# Patient Record
Sex: MALE | Race: BLACK OR AFRICAN AMERICAN | NOT HISPANIC OR LATINO | Employment: FULL TIME | URBAN - METROPOLITAN AREA
[De-identification: names, ages, dates, MRNs, and addresses within clinical notes are randomized per-mention and may not be internally consistent; named-entity substitution may affect disease eponyms.]

---

## 2017-09-13 ENCOUNTER — OFFICE VISIT (OUTPATIENT)
Dept: INTERNAL MEDICINE | Facility: CLINIC | Age: 37
End: 2017-09-13
Payer: COMMERCIAL

## 2017-09-13 VITALS
SYSTOLIC BLOOD PRESSURE: 128 MMHG | HEART RATE: 52 BPM | TEMPERATURE: 97 F | DIASTOLIC BLOOD PRESSURE: 88 MMHG | WEIGHT: 213.19 LBS | BODY MASS INDEX: 30.52 KG/M2 | HEIGHT: 70 IN

## 2017-09-13 DIAGNOSIS — F41.1 GENERALIZED ANXIETY DISORDER: Primary | ICD-10-CM

## 2017-09-13 PROCEDURE — 99204 OFFICE O/P NEW MOD 45 MIN: CPT | Mod: S$GLB,,, | Performed by: INTERNAL MEDICINE

## 2017-09-13 PROCEDURE — 99999 PR PBB SHADOW E&M-NEW PATIENT-LVL III: CPT | Mod: PBBFAC,,, | Performed by: INTERNAL MEDICINE

## 2017-09-13 PROCEDURE — 3008F BODY MASS INDEX DOCD: CPT | Mod: S$GLB,,, | Performed by: INTERNAL MEDICINE

## 2017-09-13 RX ORDER — ESCITALOPRAM OXALATE 10 MG/1
10 TABLET ORAL DAILY
Qty: 30 TABLET | Refills: 1 | Status: SHIPPED | OUTPATIENT
Start: 2017-09-13 | End: 2017-10-11 | Stop reason: SDUPTHER

## 2017-09-13 NOTE — PATIENT INSTRUCTIONS
Understanding Generalized Anxiety Disorder (HOANG)  Anxiety can fill you with worry and fear. Sometimes anxiety is healthy. It alerts you to a potential threat and gets you to respond and take action. But for some people, anxiety gets so bad it causes problems in daily life. If you find yourself in a constant state of anxiety, you may have an anxiety disorder called generalized anxiety disorder (HOANG). Speak with your healthcare provider or mental health professional to learn more. He or she can help.     What is generalized anxiety disorder?  With HOANG, you might worry about money, your family and friends, work, or the world in general. You might not even be sure what you're anxious about. But whatever it is, you have an intense fear that the worst will happen. These feelings never really go away. In people age 65 and older, HOANG is one of the most commonly diagnosed anxiety disorders.  Many times it occurs with depression. This constant worry affects your quality of life and makes it hard to function. HOANG can cause physical symptoms, too.  What are common symptoms of generalized anxiety disorder?  People with HOANG often think they have a physical illness. The disorder can cause symptoms, such as:  · Muscle tension, especially in the neck and shoulders  · Nausea and stomach problems  · Frequent headaches  · Feeling lightheaded  · Restlessness, trouble sleeping  · Feeling irritable and on edge all the time  How can generalized anxiety disorder be treated?  HOANG can be treated with medicine or therapy (also called counseling), or both. Medicine helps to reduce symptoms, so you can continue with your daily routine. Therapy helps you understand the cause of your anxiety and learn how to manage it. Both forms of treatment help you deal with problems that anxiety causes in your life. This helps you to be healthier and happier.  Date Last Reviewed: 5/1/2017  © 8282-2235 [employer] [place]. [address] [facility],  [place] [address]. All rights reserved. This information is not intended as a substitute for professional medical care. Always follow your healthcare professional's instructions.        Escitalopram tablets  What is this medicine?  ESCITALOPRAM (es sye TANIA oh pram) is used to treat depression and certain types of anxiety.  How should I use this medicine?  Take this medicine by mouth with a glass of water. Follow the directions on the prescription label. You can take it with or without food. If it upsets your stomach, take it with food. Take your medicine at regular intervals. Do not take it more often than directed. Do not stop taking this medicine suddenly except upon the advice of your doctor. Stopping this medicine too quickly may cause serious side effects or your condition may worsen.  A special MedGuide will be given to you by the pharmacist with each prescription and refill. Be sure to read this information carefully each time.  Talk to your pediatrician regarding the use of this medicine in children. Special care may be needed.  What side effects may I notice from receiving this medicine?  Side effects that you should report to your doctor or health care professional as soon as possible:  · allergic reactions like skin rash, itching or hives, swelling of the face, lips, or tongue  · confusion  · feeling faint or lightheaded, falls  · fast talking and excited feelings or actions that are out of control  · hallucination, loss of contact with reality  · seizures  · suicidal thoughts or other mood changes  · unusual bleeding or bruising  Side effects that usually do not require medical attention (report to your doctor or health care professional if they continue or are bothersome):  · blurred vision  · changes in appetite  · change in sex drive or performance  · headache  · increased sweating  · nausea  What may interact with this medicine?  Do not take this medicine with any of the following  medications:  · certain medicines for fungal infections like fluconazole, itraconazole, ketoconazole, posaconazole, voriconazole  · cisapride  · citalopram  · dofetilide  · dronedarone  · linezolid  · MAOIs like Carbex, Eldepryl, Marplan, Nardil, and Parnate  · methylene blue (injected into a vein)  · pimozide  · thioridazine  · ziprasidone  This medicine may also interact with the following medications:  · alcohol  · aspirin and aspirin-like medicines  · carbamazepine  · certain medicines for depression, anxiety, or psychotic disturbances  · certain medicines for migraine headache like almotriptan, eletriptan, frovatriptan, naratriptan, rizatriptan, sumatriptan, zolmitriptan  · certain medicines for sleep  · certain medicines that treat or prevent blood clots like warfarin, enoxaparin, dalteparin  · cimetidine  · diuretics  · fentanyl  · furazolidone  · isoniazid  · lithium  · metoprolol  · NSAIDs, medicines for pain and inflammation, like ibuprofen or naproxen  · other medicines that prolong the QT interval (cause an abnormal heart rhythm)  · procarbazine  · rasagiline  · supplements like [place]'s wort, kava kava, valerian  · tramadol  · tryptophan  What if I miss a dose?  If you miss a dose, take it as soon as you can. If it is almost time for your next dose, take only that dose. Do not take double or extra doses.  Where should I keep my medicine?  Keep out of reach of children.  Store at room temperature between 15 and 30 degrees C (59 and 86 degrees F). Throw away any unused medicine after the expiration date.  What should I tell my health care provider before I take this medicine?  They need to know if you have any of these conditions:  · bipolar disorder or a family history of bipolar disorder  · diabetes  · glaucoma  · heart disease  · kidney or liver disease  · receiving electroconvulsive therapy  · seizures (convulsions)  · suicidal thoughts, plans, or attempt by you or a family member  · an unusual or  allergic reaction to escitalopram, the related drug citalopram, other medicines, foods, dyes, or preservatives  · pregnant or trying to become pregnant  · breast-feeding  What should I watch for while using this medicine?  Tell your doctor if your symptoms do not get better or if they get worse. Visit your doctor or health care professional for regular checks on your progress. Because it may take several weeks to see the full effects of this medicine, it is important to continue your treatment as prescribed by your doctor.  Patients and their families should watch out for new or worsening thoughts of suicide or depression. Also watch out for sudden changes in feelings such as feeling anxious, agitated, panicky, irritable, hostile, aggressive, impulsive, severely restless, overly excited and hyperactive, or not being able to sleep. If this happens, especially at the beginning of treatment or after a change in dose, call your health care professional.  You may get drowsy or dizzy. Do not drive, use machinery, or do anything that needs mental alertness until you know how this medicine affects you. Do not stand or sit up quickly, especially if you are an older patient. This reduces the risk of dizzy or fainting spells. Alcohol may interfere with the effect of this medicine. Avoid alcoholic drinks.  Your mouth may get dry. Chewing sugarless gum or sucking hard candy, and drinking plenty of water may help. Contact your doctor if the problem does not go away or is severe.  NOTE:This sheet is a summary. It may not cover all possible information. If you have questions about this medicine, talk to your doctor, pharmacist, or health care provider. Copyright© 2017 [employer]

## 2017-09-13 NOTE — PROGRESS NOTES
Subjective:       Patient ID: Ez Muniz Sr. is a 37 y.o. male.    Chief Complaint: Establish Care    37 y.o. Unknown male     Patient presents with:  Establish Care    HPI: Here today to establish care. He was previously a patient of Dr. Godwin.   He is having a lot of anxiety. He states he has had symptoms for a while but it seems to be getting worse. He has symptoms daily and several times during the day. He has seen a counselor.   He denies depression.   He denies substance use.     Past Medical History:  Anxiety  Hypertension  Insomnia    Past Surgical History:  WISDOM TOOTH EXTRACTION    Review of patient's family history indicates:    No Known Problems              Mother                    No Known Problems              Father                    No current outpatient prescriptions on file prior to visit.  No current facility-administered medications on file prior to visit.     Allergies:   Review of patient's allergies indicates:  No Known Allergies              Review of Systems   Constitutional: Negative for fatigue, fever and unexpected weight change.   HENT: Negative for hearing loss, trouble swallowing and voice change.    Eyes: Negative for visual disturbance.   Respiratory: Negative for cough and shortness of breath.    Cardiovascular: Negative for chest pain, palpitations and leg swelling.   Gastrointestinal: Negative for abdominal pain, blood in stool, constipation and diarrhea.   Genitourinary: Negative for difficulty urinating and dysuria.   Musculoskeletal: Negative for arthralgias, back pain, gait problem and joint swelling.   Neurological: Negative for dizziness and headaches.   Psychiatric/Behavioral: Positive for sleep disturbance. Negative for dysphoric mood. The patient is nervous/anxious.        Objective:      Physical Exam   Constitutional: He is oriented to person, place, and time. He appears well-developed and well-nourished. No distress.   Eyes: No scleral icterus.   Neck:  No tracheal deviation present. No thyromegaly present.   Cardiovascular: Normal rate, regular rhythm and normal heart sounds.    Pulmonary/Chest: Effort normal and breath sounds normal. No respiratory distress.   Abdominal: Soft. Bowel sounds are normal.   Lymphadenopathy:     He has no cervical adenopathy.   Neurological: He is alert and oriented to person, place, and time.   Skin: Skin is warm and dry.   Psychiatric: He has a normal mood and affect.   Vitals reviewed.      Assessment:       1. Generalized anxiety disorder        Plan:       Ez was seen today for establish care.    Diagnoses and all orders for this visit:    Generalized anxiety disorder  -     escitalopram oxalate (LEXAPRO) 10 MG tablet; Take 1 tablet (10 mg total) by mouth once daily. Discussed medication risks and benefits.   -     Recommend continued counseling     F/U in 4 weeks

## 2017-10-11 ENCOUNTER — OFFICE VISIT (OUTPATIENT)
Dept: INTERNAL MEDICINE | Facility: CLINIC | Age: 37
End: 2017-10-11
Payer: COMMERCIAL

## 2017-10-11 VITALS
BODY MASS INDEX: 30.42 KG/M2 | TEMPERATURE: 97 F | HEART RATE: 65 BPM | OXYGEN SATURATION: 95 % | SYSTOLIC BLOOD PRESSURE: 120 MMHG | WEIGHT: 212.5 LBS | HEIGHT: 70 IN | DIASTOLIC BLOOD PRESSURE: 80 MMHG

## 2017-10-11 DIAGNOSIS — R05.9 COUGH: ICD-10-CM

## 2017-10-11 DIAGNOSIS — F41.1 GENERALIZED ANXIETY DISORDER: Primary | ICD-10-CM

## 2017-10-11 PROCEDURE — 99213 OFFICE O/P EST LOW 20 MIN: CPT | Mod: S$GLB,,, | Performed by: INTERNAL MEDICINE

## 2017-10-11 PROCEDURE — 99999 PR PBB SHADOW E&M-EST. PATIENT-LVL III: CPT | Mod: PBBFAC,,, | Performed by: INTERNAL MEDICINE

## 2017-10-11 RX ORDER — ESCITALOPRAM OXALATE 10 MG/1
10 TABLET ORAL DAILY
Qty: 90 TABLET | Refills: 1 | Status: SHIPPED | OUTPATIENT
Start: 2017-10-11 | End: 2018-07-20 | Stop reason: SDUPTHER

## 2017-10-11 RX ORDER — PROMETHAZINE HYDROCHLORIDE AND DEXTROMETHORPHAN HYDROBROMIDE 6.25; 15 MG/5ML; MG/5ML
5 SYRUP ORAL EVERY 8 HOURS PRN
Qty: 118 ML | Refills: 0 | Status: SHIPPED | OUTPATIENT
Start: 2017-10-11 | End: 2017-10-21

## 2017-10-11 NOTE — PATIENT INSTRUCTIONS
Viral Upper Respiratory Illness (Adult)  You have a viral upper respiratory illness (URI), which is another term for the common cold. This illness is contagious during the first few days. It is spread through the air by coughing and sneezing. It may also be spread by direct contact (touching the sick person and then touching your own eyes, nose, or mouth). Frequent handwashing will decrease risk of spread. Most viral illnesses go away within 7 to 10 days with rest and simple home remedies. Sometimes the illness may last for several weeks. Antibiotics will not kill a virus, and they are generally not prescribed for this condition.    Home care  · If symptoms are severe, rest at home for the first 2 to 3 days. When you resume activity, don't let yourself get too tired.  · Avoid being exposed to cigarette smoke (yours or others).  · You may use acetaminophen or ibuprofen to control pain and fever, unless another medicine was prescribed. (Note: If you have chronic liver or kidney disease, have ever had a stomach ulcer or gastrointestinal bleeding, or are taking blood-thinning medicines, talk with your healthcare provider before using these medicines.) Aspirin should never be given to anyone under 18 years of age who is ill with a viral infection or fever. It may cause severe liver or brain damage.  · Your appetite may be poor, so a light diet is fine. Avoid dehydration by drinking 6 to 8 glasses of fluids per day (water, soft drinks, juices, tea, or soup). Extra fluids will help loosen secretions in the nose and lungs.  · Over-the-counter cold medicines will not shorten the length of time youre sick, but they may be helpful for the following symptoms: cough, sore throat, and nasal and sinus congestion. (Note: Do not use decongestants if you have high blood pressure.)  Follow-up care  Follow up with your healthcare provider, or as advised.  When to seek medical advice  Call your healthcare provider right away if any  of these occur:  · Cough with lots of colored sputum (mucus)  · Severe headache; face, neck, or ear pain  · Difficulty swallowing due to throat pain  · Fever of 100.4°F (38°C)  Call 911, or get immediate medical care  Call emergency services right away if any of these occur:  · Chest pain, shortness of breath, wheezing, or difficulty breathing  · Coughing up blood  · Inability to swallow due to throat pain  Date Last Reviewed: 9/13/2015  © 3917-0080 [employer]. [address] [place] [address]. All rights reserved. This information is not intended as a substitute for professional medical care. Always follow your healthcare professional's instructions.

## 2017-10-11 NOTE — PROGRESS NOTES
Ez Garnica Muniz Sr.  37 y.o. Unknown male     Chief Complaint   Patient presents with    Follow-up        HPI: Presents to the clinic for a 4 week f/u on anxiety. He was started on Lexapro and feels it has helped. He reports improved anxiety. He denies side effects.   He is complaining of a cough. He recently went to urgent care and was prescribed an antibiotic. He has completed the antibiotic. He states the cough is improving but OTC medication is not helping. He denies having a fever    PMH: Reviewed    MEDS: Reviewed med card    ALLERGIES: Reviewed allergy card    PE: Reviewed vitals  GENERAL: Alert and oriented, no acute distress  HEART: Regular rate and rhythm  LUNGS: Unlabored respirations, bilaterally clear to auscultation     ASSESSMENT/PLAN:    Ez was seen today for follow-up.    Diagnoses and all orders for this visit:    Generalized anxiety disorder  -     Continue escitalopram oxalate (LEXAPRO) 10 MG tablet; Take 1 tablet (10 mg total) by mouth once daily.    Cough  -     No need for continued antibiotics since improving and no red flags  -     promethazine-dextromethorphan (PROMETHAZINE-DM) 6.25-15 mg/5 mL Syrp; Take 5 mLs by mouth every 8 (eight) hours as needed.  -     Educational information provided    RTC: 3 months for anxiety

## 2018-01-10 ENCOUNTER — PATIENT OUTREACH (OUTPATIENT)
Dept: ADMINISTRATIVE | Facility: HOSPITAL | Age: 38
End: 2018-01-10

## 2018-01-24 ENCOUNTER — OFFICE VISIT (OUTPATIENT)
Dept: INTERNAL MEDICINE | Facility: CLINIC | Age: 38
End: 2018-01-24
Payer: COMMERCIAL

## 2018-01-24 VITALS
DIASTOLIC BLOOD PRESSURE: 84 MMHG | HEART RATE: 59 BPM | SYSTOLIC BLOOD PRESSURE: 118 MMHG | TEMPERATURE: 97 F | HEIGHT: 70 IN | WEIGHT: 222.44 LBS | OXYGEN SATURATION: 96 % | BODY MASS INDEX: 31.85 KG/M2

## 2018-01-24 DIAGNOSIS — E66.9 OBESITY (BMI 30.0-34.9): ICD-10-CM

## 2018-01-24 DIAGNOSIS — Z13.220 SCREENING FOR HYPERLIPIDEMIA: ICD-10-CM

## 2018-01-24 DIAGNOSIS — F41.1 GENERALIZED ANXIETY DISORDER: Primary | ICD-10-CM

## 2018-01-24 PROBLEM — E66.811 OBESITY (BMI 30.0-34.9): Status: ACTIVE | Noted: 2018-01-24

## 2018-01-24 PROCEDURE — 99999 PR PBB SHADOW E&M-EST. PATIENT-LVL III: CPT | Mod: PBBFAC,,, | Performed by: INTERNAL MEDICINE

## 2018-01-24 PROCEDURE — 99213 OFFICE O/P EST LOW 20 MIN: CPT | Mod: S$GLB,,, | Performed by: INTERNAL MEDICINE

## 2018-01-24 NOTE — PROGRESS NOTES
Ez Garnica Flavio Sr.  37 y.o. Unknown male    Chief Complaint   Patient presents with    Follow-up     HPI: Here today to follow up on anxiety. He states the Lexapro is working well. He takes it daily as prescribed. He denies side effects. He has no other complaints.     Past Medical History:   Diagnosis Date    Anxiety     Hypertension     Insomnia        Current Outpatient Prescriptions on File Prior to Visit   Medication Sig Dispense Refill    escitalopram oxalate (LEXAPRO) 10 MG tablet Take 1 tablet (10 mg total) by mouth once daily. 90 tablet 1     No current facility-administered medications on file prior to visit.        Allergies:  Review of patient's allergies indicates:  No Known Allergies    ROS:  Denies headache, dizziness, chest pain or shortness of breath    PHYSICAL EXAM:  VITAL SIGNS: Reviewed  GENERAL: Alert and oriented, no acute distress  HEART: Regular rate   LUNGS: Respirations unlabored  PSYCH: Mood and affect appropriate     ASSESSMENT/PLAN:  Ez was seen today for follow-up.    Diagnoses and all orders for this visit:    Generalized anxiety disorder  -     Continue Lexapro    Obesity (BMI 30.0-34.9)  -     Lifestyle modifications discussed    Screening for hyperlipidemia  -     Lipid panel; Future    F/U in 6 months and as needed.

## 2018-04-17 ENCOUNTER — OFFICE VISIT (OUTPATIENT)
Dept: INTERNAL MEDICINE | Facility: CLINIC | Age: 38
End: 2018-04-17
Payer: COMMERCIAL

## 2018-04-17 VITALS
WEIGHT: 219.38 LBS | SYSTOLIC BLOOD PRESSURE: 110 MMHG | TEMPERATURE: 98 F | DIASTOLIC BLOOD PRESSURE: 86 MMHG | HEIGHT: 70 IN | BODY MASS INDEX: 31.41 KG/M2 | HEART RATE: 58 BPM | OXYGEN SATURATION: 97 %

## 2018-04-17 DIAGNOSIS — R10.9 ABDOMINAL DISCOMFORT: Primary | ICD-10-CM

## 2018-04-17 PROCEDURE — 99213 OFFICE O/P EST LOW 20 MIN: CPT | Mod: S$GLB,,, | Performed by: INTERNAL MEDICINE

## 2018-04-17 PROCEDURE — 99999 PR PBB SHADOW E&M-EST. PATIENT-LVL III: CPT | Mod: PBBFAC,,, | Performed by: INTERNAL MEDICINE

## 2018-04-17 NOTE — PATIENT INSTRUCTIONS
Abdominal Pain    Abdominal pain is pain in the stomach or belly area. Everyone has this pain from time to time. In many cases it goes away on its own. But abdominal pain can sometimes be due to a serious problem, such as appendicitis. So its important to know when to seek help.  Causes of abdominal pain  There are many possible causes of abdominal pain. Common causes in adults include:  · Constipation, diarrhea, or gas  · Stomach acid flowing back up into the esophagus (acid reflux or heartburn)  · Severe acid reflux, called GERD (gastroesophageal reflux disease)  · A sore in the lining of the stomach or small intestine (peptic ulcer)  · Inflammation of the gallbladder, liver, or pancreas  · Gallstones or kidney stones  · Appendicitis   · Intestinal blockage   · An internal organ pushing through a muscle or other tissue (hernia)  · Urinary tract infections  · In women, menstrual cramps, fibroids, or endometriosis  · Inflammation or infection of the intestines  Diagnosing the cause of abdominal pain  Your healthcare provider will do a physical exam help find the cause of your pain. If needed, tests will be ordered. Belly pain has many possible causes. So it can be hard to find the reason for your pain. Giving details about your pain can help. Tell your provider where and when you feel the pain, and what makes it better or worse. Also let your provider know if you have other symptoms such as:  · Fever  · Tiredness  · Upset stomach (nausea)  · Vomiting  · Changes in bathroom habits  Treating abdominal pain  Some causes of pain need emergency medical treatment right away. These include appendicitis or a bowel blockage. Other problems can be treated with rest, fluids, or medicines. Your healthcare provider can give you specific instructions for treatment or self-care based on what is causing your pain.  If you have vomiting or diarrhea, sip water or other clear fluids. When you are ready to eat solid foods again,  start with small amounts of easy-to-digest, low-fat foods. These include apple sauce, toast, or crackers.   When to seek medical care  Call 911 or go to the hospital right away if you:  · Cant pass stool and are vomiting  · Are vomiting blood or have bloody diarrhea or black, tarry diarrhea  · Have chest, neck, or shoulder pain  · Feel like you might pass out  · Have pain in your shoulder blades with nausea  · Have sudden, severe belly pain  · Have new, severe pain unlike any you have felt before  · Have a belly that is rigid, hard, and tender to touch  Call your healthcare provider if you have:  · Pain for more than 5 days  · Bloating for more than 2 days  · Diarrhea for more than 5 days  · A fever of 100.4°F (38.0°C) or higher, or as directed by your provider  · Pain that gets worse  · Weight loss for no reason  · Continued lack of appetite  · Blood in your stool  How to prevent abdominal pain  Here are some tips to help prevent abdominal pain:  · Eat smaller amounts of food at one time.  · Avoid greasy, fried, or other high-fat foods.  · Avoid foods that give you gas.  · Exercise regularly.  · Drink plenty of fluids.  To help prevent GERD symptoms:  · Quit smoking.  · Reduce alcohol and certain foods that increase stomach acid.  · Avoid aspirin and over-the-counter pain and fever medicines (NSAIDS or nonsteroidal anti-inflammatory drugs), if possible  · Lose extra weight.  · Finish eating at least 2 hours before you go to bed or lie down.  · Raise the head of your bed.  Date Last Reviewed: 7/1/2016  © 5472-6573 [employer]. [address] [place] [address], [place] [address]. All rights reserved. This information is not intended as a substitute for professional medical care. Always follow your healthcare professional's instructions.

## 2018-04-17 NOTE — PROGRESS NOTES
Subjective:       Patient ID: Ez Muniz Sr. is a 37 y.o. male.    Chief Complaint: Abdominal Pain    Abdominal Pain   This is a new problem. The current episode started more than 1 month ago. The onset quality is undetermined. The problem occurs intermittently. The problem has been waxing and waning. The pain is located in the generalized abdominal region. The pain is mild. The quality of the pain is burning and cramping. The abdominal pain does not radiate. Associated symptoms include belching. Pertinent negatives include no anorexia, constipation, diarrhea, dysuria, fever, hematochezia, melena, nausea, vomiting or weight loss. Nothing aggravates the pain. The pain is relieved by nothing. He has tried proton pump inhibitors for the symptoms. The treatment provided mild relief. There is no history of abdominal surgery.     Review of Systems   Constitutional: Negative for appetite change, fever, unexpected weight change and weight loss.   Gastrointestinal: Positive for abdominal pain. Negative for anorexia, blood in stool, constipation, diarrhea, hematochezia, melena, nausea and vomiting.   Genitourinary: Negative for dysuria.       Objective:      Physical Exam   Constitutional: He is oriented to person, place, and time. He appears well-developed and well-nourished. No distress.   Eyes: No scleral icterus.   Cardiovascular: Normal rate.    Pulmonary/Chest: Effort normal.   Abdominal: Soft. He exhibits no distension. There is no tenderness. There is no rebound and no guarding.   Neurological: He is alert and oriented to person, place, and time.   Skin: Skin is warm and dry.   Psychiatric: He has a normal mood and affect.   Vitals reviewed.      Assessment:       1. Abdominal discomfort        Plan:       Ez was seen today for abdominal pain.    Diagnoses and all orders for this visit:    Abdominal discomfort  -     Recommend Activia vs probiotics  -     Monitor    RTC as needed

## 2018-07-17 ENCOUNTER — PATIENT OUTREACH (OUTPATIENT)
Dept: ADMINISTRATIVE | Facility: HOSPITAL | Age: 38
End: 2018-07-17

## 2018-07-20 DIAGNOSIS — F41.1 GENERALIZED ANXIETY DISORDER: ICD-10-CM

## 2018-07-23 RX ORDER — ESCITALOPRAM OXALATE 10 MG/1
TABLET ORAL
Qty: 90 TABLET | Refills: 0 | Status: SHIPPED | OUTPATIENT
Start: 2018-07-23 | End: 2019-04-10 | Stop reason: SDUPTHER

## 2019-04-04 DIAGNOSIS — F41.1 GENERALIZED ANXIETY DISORDER: ICD-10-CM

## 2019-04-04 RX ORDER — ESCITALOPRAM OXALATE 10 MG/1
TABLET ORAL
Qty: 90 TABLET | Refills: 3 | OUTPATIENT
Start: 2019-04-04

## 2019-04-10 DIAGNOSIS — F41.1 GENERALIZED ANXIETY DISORDER: ICD-10-CM

## 2019-04-10 RX ORDER — ESCITALOPRAM OXALATE 10 MG/1
TABLET ORAL
Qty: 90 TABLET | Refills: 0 | Status: SHIPPED | OUTPATIENT
Start: 2019-04-10 | End: 2019-04-29 | Stop reason: SDUPTHER

## 2019-04-29 ENCOUNTER — OFFICE VISIT (OUTPATIENT)
Dept: INTERNAL MEDICINE | Facility: CLINIC | Age: 39
End: 2019-04-29
Payer: COMMERCIAL

## 2019-04-29 VITALS
OXYGEN SATURATION: 97 % | DIASTOLIC BLOOD PRESSURE: 88 MMHG | BODY MASS INDEX: 30.65 KG/M2 | HEIGHT: 70 IN | WEIGHT: 214.06 LBS | TEMPERATURE: 98 F | SYSTOLIC BLOOD PRESSURE: 130 MMHG | HEART RATE: 65 BPM

## 2019-04-29 DIAGNOSIS — E66.9 OBESITY (BMI 30.0-34.9): ICD-10-CM

## 2019-04-29 DIAGNOSIS — F41.1 GENERALIZED ANXIETY DISORDER: ICD-10-CM

## 2019-04-29 DIAGNOSIS — Z00.00 ANNUAL PHYSICAL EXAM: Primary | ICD-10-CM

## 2019-04-29 PROCEDURE — 99395 PR PREVENTIVE VISIT,EST,18-39: ICD-10-PCS | Mod: S$GLB,,, | Performed by: INTERNAL MEDICINE

## 2019-04-29 PROCEDURE — 99999 PR PBB SHADOW E&M-EST. PATIENT-LVL III: CPT | Mod: PBBFAC,,, | Performed by: INTERNAL MEDICINE

## 2019-04-29 PROCEDURE — 99395 PREV VISIT EST AGE 18-39: CPT | Mod: S$GLB,,, | Performed by: INTERNAL MEDICINE

## 2019-04-29 PROCEDURE — 99999 PR PBB SHADOW E&M-EST. PATIENT-LVL III: ICD-10-PCS | Mod: PBBFAC,,, | Performed by: INTERNAL MEDICINE

## 2019-04-29 RX ORDER — ESCITALOPRAM OXALATE 10 MG/1
TABLET ORAL
Qty: 90 TABLET | Refills: 3 | Status: SHIPPED | OUTPATIENT
Start: 2019-04-29 | End: 2020-05-19

## 2019-04-29 NOTE — PROGRESS NOTES
Subjective:       Patient ID: Ez Muniz Sr. is a 38 y.o. male.    Chief Complaint: Annual Exam    Ez Muniz Sr.  38 y.o. Unknown male     Patient presents with:  Annual Exam    HPI: Here today for an annual physical. He is without significant complaints.   Anxiety--stable on Lexapro.   He needs refills.   He is . He has two sons.   He is active.     Lipid Panel due on 1982  Influenza Vaccine due on 09/13/2019  TETANUS VACCINE due on 10/11/2027    Past Medical History:  Anxiety  Hypertension  Insomnia    Past Surgical History:  WISDOM TOOTH EXTRACTION    Review of patient's family history indicates:  Problem: No Known Problems      Relation: Mother          Age of Onset: (Not Specified)  Problem: No Known Problems      Relation: Father          Age of Onset: (Not Specified)      Current Outpatient Medications on File Prior to Visit:  escitalopram oxalate (LEXAPRO) 10 MG tablet, TAKE 1 TABLET BY MOUTH ONE TIME DAILY, Disp: 90 tablet, Rfl: 0    Allergies:  Review of patient's allergies indicates:  No Known Allergies              Review of Systems   Constitutional: Negative for activity change, appetite change, fever and unexpected weight change.   HENT: Negative for hearing loss and trouble swallowing.    Eyes: Negative for visual disturbance.   Respiratory: Negative for cough and shortness of breath.    Cardiovascular: Negative for chest pain and leg swelling.   Gastrointestinal: Negative for abdominal pain, constipation and diarrhea.   Genitourinary: Negative for difficulty urinating.   Musculoskeletal: Negative for arthralgias, back pain and gait problem.   Neurological: Negative for dizziness and headaches.   Psychiatric/Behavioral: Negative for sleep disturbance. The patient is nervous/anxious.        Objective:      Physical Exam   Constitutional: He is oriented to person, place, and time. He appears well-developed and well-nourished. No distress.   Eyes: No scleral icterus.    Neck: No JVD present. No thyromegaly present.   Cardiovascular: Normal rate, regular rhythm and normal heart sounds.   Pulmonary/Chest: Effort normal and breath sounds normal. No respiratory distress. He has no wheezes. He has no rales.   Abdominal: Soft. Bowel sounds are normal.   Musculoskeletal: He exhibits no edema.   Lymphadenopathy:     He has no cervical adenopathy.   Neurological: He is alert and oriented to person, place, and time.   Skin: Skin is warm and dry.   Psychiatric: He has a normal mood and affect.   Vitals reviewed.      Assessment:       1. Annual physical exam    2. Generalized anxiety disorder    3. Obesity (BMI 30.0-34.9)        Plan:       Ez was seen today for annual exam.    Diagnoses and all orders for this visit:    Annual physical exam  -     Counseled regarding age appropriate screenings and immunizations  -     Counseled regarding lifestyle modifications  -     CBC auto differential; Future  -     Comprehensive metabolic panel; Future  -     TSH; Future  -     Lipid panel; Future    Generalized anxiety disorder  -     Refill escitalopram oxalate (LEXAPRO) 10 MG tablet; TAKE 1 TABLET BY MOUTH ONE TIME DAILY    Obesity (BMI 30.0-34.9)  -     Lifestyle modifications discussed    RTC annually and as needed.

## 2019-05-21 ENCOUNTER — LAB VISIT (OUTPATIENT)
Dept: LAB | Facility: HOSPITAL | Age: 39
End: 2019-05-21
Attending: INTERNAL MEDICINE
Payer: COMMERCIAL

## 2019-05-21 DIAGNOSIS — Z00.00 ANNUAL PHYSICAL EXAM: ICD-10-CM

## 2019-05-21 LAB
ALBUMIN SERPL BCP-MCNC: 4 G/DL (ref 3.5–5.2)
ALP SERPL-CCNC: 63 U/L (ref 55–135)
ALT SERPL W/O P-5'-P-CCNC: 20 U/L (ref 10–44)
ANION GAP SERPL CALC-SCNC: 8 MMOL/L (ref 8–16)
AST SERPL-CCNC: 23 U/L (ref 10–40)
BASOPHILS # BLD AUTO: 0.04 K/UL (ref 0–0.2)
BASOPHILS NFR BLD: 1.2 % (ref 0–1.9)
BILIRUB SERPL-MCNC: 0.5 MG/DL (ref 0.1–1)
BUN SERPL-MCNC: 14 MG/DL (ref 6–20)
CALCIUM SERPL-MCNC: 9.3 MG/DL (ref 8.7–10.5)
CHLORIDE SERPL-SCNC: 105 MMOL/L (ref 95–110)
CHOLEST SERPL-MCNC: 197 MG/DL (ref 120–199)
CHOLEST/HDLC SERPL: 3.8 {RATIO} (ref 2–5)
CO2 SERPL-SCNC: 27 MMOL/L (ref 23–29)
CREAT SERPL-MCNC: 1.1 MG/DL (ref 0.5–1.4)
DIFFERENTIAL METHOD: ABNORMAL
EOSINOPHIL # BLD AUTO: 0 K/UL (ref 0–0.5)
EOSINOPHIL NFR BLD: 0.6 % (ref 0–8)
ERYTHROCYTE [DISTWIDTH] IN BLOOD BY AUTOMATED COUNT: 12.7 % (ref 11.5–14.5)
EST. GFR  (AFRICAN AMERICAN): >60 ML/MIN/1.73 M^2
EST. GFR  (NON AFRICAN AMERICAN): >60 ML/MIN/1.73 M^2
GLUCOSE SERPL-MCNC: 83 MG/DL (ref 70–110)
HCT VFR BLD AUTO: 44.7 % (ref 40–54)
HDLC SERPL-MCNC: 52 MG/DL (ref 40–75)
HDLC SERPL: 26.4 % (ref 20–50)
HGB BLD-MCNC: 14.6 G/DL (ref 14–18)
IMM GRANULOCYTES # BLD AUTO: 0.01 K/UL (ref 0–0.04)
IMM GRANULOCYTES NFR BLD AUTO: 0.3 % (ref 0–0.5)
LDLC SERPL CALC-MCNC: 133.4 MG/DL (ref 63–159)
LYMPHOCYTES # BLD AUTO: 1.6 K/UL (ref 1–4.8)
LYMPHOCYTES NFR BLD: 47 % (ref 18–48)
MCH RBC QN AUTO: 29.3 PG (ref 27–31)
MCHC RBC AUTO-ENTMCNC: 32.7 G/DL (ref 32–36)
MCV RBC AUTO: 90 FL (ref 82–98)
MONOCYTES # BLD AUTO: 0.2 K/UL (ref 0.3–1)
MONOCYTES NFR BLD: 6.3 % (ref 4–15)
NEUTROPHILS # BLD AUTO: 1.5 K/UL (ref 1.8–7.7)
NEUTROPHILS NFR BLD: 44.6 % (ref 38–73)
NONHDLC SERPL-MCNC: 145 MG/DL
NRBC BLD-RTO: 0 /100 WBC
PLATELET # BLD AUTO: 293 K/UL (ref 150–350)
PMV BLD AUTO: 9.5 FL (ref 9.2–12.9)
POTASSIUM SERPL-SCNC: 4.6 MMOL/L (ref 3.5–5.1)
PROT SERPL-MCNC: 7.5 G/DL (ref 6–8.4)
RBC # BLD AUTO: 4.98 M/UL (ref 4.6–6.2)
SODIUM SERPL-SCNC: 140 MMOL/L (ref 136–145)
TRIGL SERPL-MCNC: 58 MG/DL (ref 30–150)
TSH SERPL DL<=0.005 MIU/L-ACNC: 1.14 UIU/ML (ref 0.4–4)
WBC # BLD AUTO: 3.36 K/UL (ref 3.9–12.7)

## 2019-05-21 PROCEDURE — 80053 COMPREHEN METABOLIC PANEL: CPT

## 2019-05-21 PROCEDURE — 85025 COMPLETE CBC W/AUTO DIFF WBC: CPT

## 2019-05-21 PROCEDURE — 36415 COLL VENOUS BLD VENIPUNCTURE: CPT

## 2019-05-21 PROCEDURE — 80061 LIPID PANEL: CPT

## 2019-05-21 PROCEDURE — 84443 ASSAY THYROID STIM HORMONE: CPT

## 2019-05-23 ENCOUNTER — TELEPHONE (OUTPATIENT)
Dept: INTERNAL MEDICINE | Facility: CLINIC | Age: 39
End: 2019-05-23

## 2019-05-23 DIAGNOSIS — D72.819 LEUKOPENIA, UNSPECIFIED TYPE: Primary | ICD-10-CM

## 2020-05-18 DIAGNOSIS — F41.1 GENERALIZED ANXIETY DISORDER: ICD-10-CM

## 2020-05-19 RX ORDER — ESCITALOPRAM OXALATE 10 MG/1
TABLET ORAL
Qty: 90 TABLET | Refills: 0 | Status: SHIPPED | OUTPATIENT
Start: 2020-05-19 | End: 2020-12-02 | Stop reason: SDUPTHER

## 2020-05-19 NOTE — TELEPHONE ENCOUNTER
Patient requesting refills but is overdue for an annual exam.   Please schedule.   Refill approved x 1.

## 2020-10-06 ENCOUNTER — PATIENT MESSAGE (OUTPATIENT)
Dept: ADMINISTRATIVE | Facility: HOSPITAL | Age: 40
End: 2020-10-06

## 2020-10-19 DIAGNOSIS — F41.1 GENERALIZED ANXIETY DISORDER: ICD-10-CM

## 2020-10-20 RX ORDER — ESCITALOPRAM OXALATE 10 MG/1
TABLET ORAL
Qty: 90 TABLET | Refills: 3 | OUTPATIENT
Start: 2020-10-20

## 2020-10-21 ENCOUNTER — TELEPHONE (OUTPATIENT)
Dept: INTERNAL MEDICINE | Facility: CLINIC | Age: 40
End: 2020-10-21

## 2020-12-02 ENCOUNTER — OFFICE VISIT (OUTPATIENT)
Dept: INTERNAL MEDICINE | Facility: CLINIC | Age: 40
End: 2020-12-02
Payer: COMMERCIAL

## 2020-12-02 ENCOUNTER — LAB VISIT (OUTPATIENT)
Dept: LAB | Facility: HOSPITAL | Age: 40
End: 2020-12-02
Attending: FAMILY MEDICINE
Payer: COMMERCIAL

## 2020-12-02 VITALS
SYSTOLIC BLOOD PRESSURE: 120 MMHG | HEART RATE: 61 BPM | OXYGEN SATURATION: 98 % | RESPIRATION RATE: 18 BRPM | TEMPERATURE: 98 F | HEIGHT: 70 IN | WEIGHT: 210.63 LBS | BODY MASS INDEX: 30.15 KG/M2 | DIASTOLIC BLOOD PRESSURE: 78 MMHG

## 2020-12-02 DIAGNOSIS — E66.9 OBESITY (BMI 30.0-34.9): ICD-10-CM

## 2020-12-02 DIAGNOSIS — F41.1 GENERALIZED ANXIETY DISORDER: ICD-10-CM

## 2020-12-02 DIAGNOSIS — Z13.220 ENCOUNTER FOR LIPID SCREENING FOR CARDIOVASCULAR DISEASE: ICD-10-CM

## 2020-12-02 DIAGNOSIS — Z13.6 ENCOUNTER FOR LIPID SCREENING FOR CARDIOVASCULAR DISEASE: ICD-10-CM

## 2020-12-02 DIAGNOSIS — Z00.00 ROUTINE PHYSICAL EXAMINATION: ICD-10-CM

## 2020-12-02 DIAGNOSIS — Z00.00 ROUTINE PHYSICAL EXAMINATION: Primary | ICD-10-CM

## 2020-12-02 LAB
ALBUMIN SERPL BCP-MCNC: 3.7 G/DL (ref 3.5–5.2)
ALP SERPL-CCNC: 62 U/L (ref 55–135)
ALT SERPL W/O P-5'-P-CCNC: 18 U/L (ref 10–44)
ANION GAP SERPL CALC-SCNC: 9 MMOL/L (ref 8–16)
AST SERPL-CCNC: 22 U/L (ref 10–40)
BASOPHILS # BLD AUTO: 0.04 K/UL (ref 0–0.2)
BASOPHILS NFR BLD: 1 % (ref 0–1.9)
BILIRUB SERPL-MCNC: 0.3 MG/DL (ref 0.1–1)
BUN SERPL-MCNC: 15 MG/DL (ref 6–20)
CALCIUM SERPL-MCNC: 9.1 MG/DL (ref 8.7–10.5)
CHLORIDE SERPL-SCNC: 104 MMOL/L (ref 95–110)
CHOLEST SERPL-MCNC: 182 MG/DL (ref 120–199)
CHOLEST/HDLC SERPL: 3 {RATIO} (ref 2–5)
CO2 SERPL-SCNC: 29 MMOL/L (ref 23–29)
CREAT SERPL-MCNC: 1.1 MG/DL (ref 0.5–1.4)
DIFFERENTIAL METHOD: ABNORMAL
EOSINOPHIL # BLD AUTO: 0 K/UL (ref 0–0.5)
EOSINOPHIL NFR BLD: 0.5 % (ref 0–8)
ERYTHROCYTE [DISTWIDTH] IN BLOOD BY AUTOMATED COUNT: 12.8 % (ref 11.5–14.5)
EST. GFR  (AFRICAN AMERICAN): >60 ML/MIN/1.73 M^2
EST. GFR  (NON AFRICAN AMERICAN): >60 ML/MIN/1.73 M^2
GLUCOSE SERPL-MCNC: 80 MG/DL (ref 70–110)
HCT VFR BLD AUTO: 43.3 % (ref 40–54)
HDLC SERPL-MCNC: 61 MG/DL (ref 40–75)
HDLC SERPL: 33.5 % (ref 20–50)
HGB BLD-MCNC: 14.2 G/DL (ref 14–18)
IMM GRANULOCYTES # BLD AUTO: 0.01 K/UL (ref 0–0.04)
IMM GRANULOCYTES NFR BLD AUTO: 0.3 % (ref 0–0.5)
LDLC SERPL CALC-MCNC: 111.2 MG/DL (ref 63–159)
LYMPHOCYTES # BLD AUTO: 2 K/UL (ref 1–4.8)
LYMPHOCYTES NFR BLD: 50.4 % (ref 18–48)
MCH RBC QN AUTO: 29.5 PG (ref 27–31)
MCHC RBC AUTO-ENTMCNC: 32.8 G/DL (ref 32–36)
MCV RBC AUTO: 90 FL (ref 82–98)
MONOCYTES # BLD AUTO: 0.3 K/UL (ref 0.3–1)
MONOCYTES NFR BLD: 7.2 % (ref 4–15)
NEUTROPHILS # BLD AUTO: 1.6 K/UL (ref 1.8–7.7)
NEUTROPHILS NFR BLD: 40.6 % (ref 38–73)
NONHDLC SERPL-MCNC: 121 MG/DL
NRBC BLD-RTO: 0 /100 WBC
PLATELET # BLD AUTO: 254 K/UL (ref 150–350)
PMV BLD AUTO: 9.6 FL (ref 9.2–12.9)
POTASSIUM SERPL-SCNC: 4 MMOL/L (ref 3.5–5.1)
PROT SERPL-MCNC: 7 G/DL (ref 6–8.4)
RBC # BLD AUTO: 4.82 M/UL (ref 4.6–6.2)
SODIUM SERPL-SCNC: 142 MMOL/L (ref 136–145)
TRIGL SERPL-MCNC: 49 MG/DL (ref 30–150)
TSH SERPL DL<=0.005 MIU/L-ACNC: 2.01 UIU/ML (ref 0.4–4)
WBC # BLD AUTO: 3.89 K/UL (ref 3.9–12.7)

## 2020-12-02 PROCEDURE — 99999 PR PBB SHADOW E&M-EST. PATIENT-LVL III: CPT | Mod: PBBFAC,,, | Performed by: FAMILY MEDICINE

## 2020-12-02 PROCEDURE — 1126F AMNT PAIN NOTED NONE PRSNT: CPT | Mod: S$GLB,,, | Performed by: FAMILY MEDICINE

## 2020-12-02 PROCEDURE — 3008F PR BODY MASS INDEX (BMI) DOCUMENTED: ICD-10-PCS | Mod: CPTII,S$GLB,, | Performed by: FAMILY MEDICINE

## 2020-12-02 PROCEDURE — 84443 ASSAY THYROID STIM HORMONE: CPT

## 2020-12-02 PROCEDURE — 3008F BODY MASS INDEX DOCD: CPT | Mod: CPTII,S$GLB,, | Performed by: FAMILY MEDICINE

## 2020-12-02 PROCEDURE — 99999 PR PBB SHADOW E&M-EST. PATIENT-LVL III: ICD-10-PCS | Mod: PBBFAC,,, | Performed by: FAMILY MEDICINE

## 2020-12-02 PROCEDURE — 36415 COLL VENOUS BLD VENIPUNCTURE: CPT

## 2020-12-02 PROCEDURE — 85025 COMPLETE CBC W/AUTO DIFF WBC: CPT

## 2020-12-02 PROCEDURE — 80061 LIPID PANEL: CPT

## 2020-12-02 PROCEDURE — 99395 PREV VISIT EST AGE 18-39: CPT | Mod: S$GLB,,, | Performed by: FAMILY MEDICINE

## 2020-12-02 PROCEDURE — 99395 PR PREVENTIVE VISIT,EST,18-39: ICD-10-PCS | Mod: S$GLB,,, | Performed by: FAMILY MEDICINE

## 2020-12-02 PROCEDURE — 80053 COMPREHEN METABOLIC PANEL: CPT

## 2020-12-02 PROCEDURE — 1126F PR PAIN SEVERITY QUANTIFIED, NO PAIN PRESENT: ICD-10-PCS | Mod: S$GLB,,, | Performed by: FAMILY MEDICINE

## 2020-12-02 RX ORDER — ESCITALOPRAM OXALATE 10 MG/1
10 TABLET ORAL DAILY
Qty: 90 TABLET | Refills: 0 | Status: SHIPPED | OUTPATIENT
Start: 2020-12-02 | End: 2021-01-04

## 2020-12-02 RX ORDER — ZOLPIDEM TARTRATE 10 MG/1
TABLET ORAL
COMMUNITY
Start: 2020-10-05

## 2020-12-02 RX ORDER — BUSPIRONE HYDROCHLORIDE 5 MG/1
5 TABLET ORAL 2 TIMES DAILY
Qty: 60 TABLET | Refills: 0 | Status: SHIPPED | OUTPATIENT
Start: 2020-12-02 | End: 2021-02-01

## 2020-12-02 NOTE — PROGRESS NOTES
"Chief Complaint: Medication Refill    HPI    ANNUAL EXAM:  Preventative Health Checklist 24-64 years of age    Ez Muniz Sr. presents today with no complaints for an annual exam.      Age appropriate counseling on vaccinations and preventative health given on 12/02/2020    Anxiety uncontrolled  Recently discontinued d/t running out of meds  Anxiety worsened while taking meds  Noted withdrawal sx, nausea, HA, hangover feeling     Review of Systems     Pt has completed a full 14 system review. All items are negative except as indicated.  All pertinent positives and negatives as in HPI  Anxiety worsened before d/c meds  Nausea, HA now improved    Objective:   /78 (BP Location: Right arm, Patient Position: Sitting, BP Method: Large (Manual))   Pulse 61   Temp 98.4 °F (36.9 °C) (Tympanic)   Resp 18   Ht 5' 10" (1.778 m)   Wt 95.5 kg (210 lb 10.4 oz)   SpO2 98%   BMI 30.23 kg/m²     Physical Exam  Vitals signs reviewed.   Constitutional:       General: He is not in acute distress.     Appearance: Normal appearance. He is well-developed. He is not ill-appearing or toxic-appearing.   HENT:      Head: Normocephalic and atraumatic.      Right Ear: Hearing normal.      Left Ear: Hearing normal.   Neck:      Musculoskeletal: Normal range of motion.   Cardiovascular:      Rate and Rhythm: Normal rate.   Pulmonary:      Effort: Pulmonary effort is normal. No respiratory distress.   Abdominal:      Palpations: Abdomen is soft.   Musculoskeletal: Normal range of motion.   Skin:     General: Skin is warm and dry.   Neurological:      Mental Status: He is alert and oriented to person, place, and time.   Psychiatric:         Behavior: Behavior normal.       Assessment:     1. Routine physical examination    2. Generalized anxiety disorder    3. Obesity (BMI 30.0-34.9)    4. Encounter for lipid screening for cardiovascular disease      Plan:     Problem List Items Addressed This Visit        Psychiatric    Generalized " anxiety disorder    Relevant Medications    escitalopram oxalate (LEXAPRO) 10 MG tablet       Endocrine    Obesity (BMI 30.0-34.9)    Relevant Orders    TSH      Other Visit Diagnoses     Routine physical examination    -  Primary    Relevant Orders    Comprehensive Metabolic Panel    CBC Auto Differential    Encounter for lipid screening for cardiovascular disease        Relevant Orders    Lipid Panel          Counseling:  Nutrition: Encouraged to take 5-10 servings fruits and vegetables daily   Exercise:  Physical activity recommendations reviewed   Avoid Tobacco Use: reviewed  Avoid ETOH/Drug Use:  reviewed  STD Prevention/Abstinence:   Avoid High Risk Behavior:   Unintended Pregnancy:    Lap-shoulder Belts:  Yes  No text/talk while driving: Reviewed  Bike/ATV Motorcycle Helmets:  N/A  Smoke Detector:  yes  Safe Storage/Removal of Firearms: reviewed    Calcium Intake (females):  Calcium recommendations   Regular Dental Visits:  yes  Floss, Brush and Fluoride: yes    Follow up in about 4 weeks (around 12/30/2020) for shamika virtual, f/u davin/pcp, fasting.

## 2020-12-02 NOTE — PATIENT INSTRUCTIONS
Buspirone tablets  What is this medicine?  BUSPIRONE (bydavid CELINA gamble) is used to treat anxiety disorders.  How should I use this medicine?  Take this medicine by mouth with a glass of water. Follow the directions on the prescription label. You may take this medicine with or without food. To ensure that this medicine always works the same way for you, you should take it either always with or always without food. Take your doses at regular intervals. Do not take your medicine more often than directed. Do not stop taking except on the advice of your doctor or health care professional.  Talk to your pediatrician regarding the use of this medicine in children. Special care may be needed.  What side effects may I notice from receiving this medicine?  Side effects that you should report to your doctor or health care professional as soon as possible:  · blurred vision or other vision changes  · chest pain  · confusion  · difficulty breathing  · feelings of hostility or anger  · muscle aches and pains  · numbness or tingling in hands or feet  · ringing in the ears  · skin rash and itching  · vomiting  · weakness  Side effects that usually do not require medical attention (report to your doctor or health care professional if they continue or are bothersome):  · disturbed dreams, nightmares  · headache  · nausea  · restlessness or nervousness  · sore throat and nasal congestion  · stomach upset  What may interact with this medicine?  Do not take this medicine with any of the following medications:  · linezolid  · MAOIs like Carbex, Eldepryl, Marplan, Nardil, and Parnate  · methylene blue  · procarbazine  This medicine may also interact with the following medications:  · diazepam  · digoxin  · diltiazem  · erythromycin  · grapefruit juice  · haloperidol  · medicines for mental depression or mood problems  · medicines for seizures like carbamazepine, phenobarbital and phenytoin  · nefazodone  · other medications for  anxiety  · rifampin  · ritonavir  · some antifungal medicines like itraconazole, ketoconazole, and voriconazole  · verapamil  · warfarin  What if I miss a dose?  If you miss a dose, take it as soon as you can. If it is almost time for your next dose, take only that dose. Do not take double or extra doses.  Where should I keep my medicine?  Keep out of the reach of children.  Store at room temperature below 30 degrees C (86 degrees F). Protect from light. Keep container tightly closed. Throw away any unused medicine after the expiration date.  What should I tell my health care provider before I take this medicine?  They need to know if you have any of these conditions:  · kidney or liver disease  · an unusual or allergic reaction to buspirone, other medicines, foods, dyes, or preservatives  · pregnant or trying to get pregnant  · breast-feeding  What should I watch for while using this medicine?  Visit your doctor or health care professional for regular checks on your progress. It may take 1 to 2 weeks before your anxiety gets better.  You may get drowsy or dizzy. Do not drive, use machinery, or do anything that needs mental alertness until you know how this drug affects you. Do not stand or sit up quickly, especially if you are an older patient. This reduces the risk of dizzy or fainting spells. Alcohol can make you more drowsy and dizzy. Avoid alcoholic drinks.  NOTE:This sheet is a summary. It may not cover all possible information. If you have questions about this medicine, talk to your doctor, pharmacist, or health care provider. Copyright© 2017 [employer]

## 2021-01-04 ENCOUNTER — PATIENT MESSAGE (OUTPATIENT)
Dept: INTERNAL MEDICINE | Facility: CLINIC | Age: 41
End: 2021-01-04

## 2021-01-04 ENCOUNTER — TELEPHONE (OUTPATIENT)
Dept: INTERNAL MEDICINE | Facility: CLINIC | Age: 41
End: 2021-01-04

## 2021-01-04 ENCOUNTER — OFFICE VISIT (OUTPATIENT)
Dept: INTERNAL MEDICINE | Facility: CLINIC | Age: 41
End: 2021-01-04
Payer: COMMERCIAL

## 2021-01-04 DIAGNOSIS — F41.1 GENERALIZED ANXIETY DISORDER: ICD-10-CM

## 2021-01-04 PROCEDURE — 99214 PR OFFICE/OUTPT VISIT, EST, LEVL IV, 30-39 MIN: ICD-10-PCS | Mod: 95,,, | Performed by: INTERNAL MEDICINE

## 2021-01-04 PROCEDURE — 99214 OFFICE O/P EST MOD 30 MIN: CPT | Mod: 95,,, | Performed by: INTERNAL MEDICINE

## 2021-01-04 RX ORDER — ESCITALOPRAM OXALATE 20 MG/1
20 TABLET ORAL DAILY
Qty: 90 TABLET | Refills: 1 | Status: SHIPPED | OUTPATIENT
Start: 2021-01-04 | End: 2021-02-01 | Stop reason: SDUPTHER

## 2021-02-01 ENCOUNTER — TELEPHONE (OUTPATIENT)
Dept: INTERNAL MEDICINE | Facility: CLINIC | Age: 41
End: 2021-02-01

## 2021-02-01 ENCOUNTER — PATIENT MESSAGE (OUTPATIENT)
Dept: INTERNAL MEDICINE | Facility: CLINIC | Age: 41
End: 2021-02-01

## 2021-02-01 ENCOUNTER — OFFICE VISIT (OUTPATIENT)
Dept: INTERNAL MEDICINE | Facility: CLINIC | Age: 41
End: 2021-02-01
Payer: COMMERCIAL

## 2021-02-01 VITALS — BODY MASS INDEX: 30.23 KG/M2 | HEIGHT: 70 IN

## 2021-02-01 DIAGNOSIS — F41.1 GENERALIZED ANXIETY DISORDER: ICD-10-CM

## 2021-02-01 PROCEDURE — 3008F BODY MASS INDEX DOCD: CPT | Mod: CPTII,,, | Performed by: INTERNAL MEDICINE

## 2021-02-01 PROCEDURE — 99213 PR OFFICE/OUTPT VISIT, EST, LEVL III, 20-29 MIN: ICD-10-PCS | Mod: 95,,, | Performed by: INTERNAL MEDICINE

## 2021-02-01 PROCEDURE — 99213 OFFICE O/P EST LOW 20 MIN: CPT | Mod: 95,,, | Performed by: INTERNAL MEDICINE

## 2021-02-01 PROCEDURE — 3008F PR BODY MASS INDEX (BMI) DOCUMENTED: ICD-10-PCS | Mod: CPTII,,, | Performed by: INTERNAL MEDICINE

## 2021-02-01 RX ORDER — ESCITALOPRAM OXALATE 20 MG/1
20 TABLET ORAL DAILY
Qty: 90 TABLET | Refills: 1 | Status: SHIPPED | OUTPATIENT
Start: 2021-02-01 | End: 2021-07-13

## 2021-03-31 ENCOUNTER — IMMUNIZATION (OUTPATIENT)
Dept: INTERNAL MEDICINE | Facility: CLINIC | Age: 41
End: 2021-03-31
Payer: COMMERCIAL

## 2021-03-31 DIAGNOSIS — Z23 NEED FOR VACCINATION: Primary | ICD-10-CM

## 2021-03-31 PROCEDURE — 91300 COVID-19, MRNA, LNP-S, PF, 30 MCG/0.3 ML DOSE VACCINE: CPT | Mod: S$GLB,,, | Performed by: FAMILY MEDICINE

## 2021-03-31 PROCEDURE — 0001A COVID-19, MRNA, LNP-S, PF, 30 MCG/0.3 ML DOSE VACCINE: CPT | Mod: CV19,S$GLB,, | Performed by: FAMILY MEDICINE

## 2021-03-31 PROCEDURE — 91300 COVID-19, MRNA, LNP-S, PF, 30 MCG/0.3 ML DOSE VACCINE: ICD-10-PCS | Mod: S$GLB,,, | Performed by: FAMILY MEDICINE

## 2021-03-31 PROCEDURE — 0001A COVID-19, MRNA, LNP-S, PF, 30 MCG/0.3 ML DOSE VACCINE: ICD-10-PCS | Mod: CV19,S$GLB,, | Performed by: FAMILY MEDICINE

## 2021-04-21 ENCOUNTER — IMMUNIZATION (OUTPATIENT)
Dept: INTERNAL MEDICINE | Facility: CLINIC | Age: 41
End: 2021-04-21
Payer: COMMERCIAL

## 2021-04-21 DIAGNOSIS — Z23 NEED FOR VACCINATION: Primary | ICD-10-CM

## 2021-04-21 PROCEDURE — 0002A COVID-19, MRNA, LNP-S, PF, 30 MCG/0.3 ML DOSE VACCINE: CPT | Mod: PBBFAC | Performed by: FAMILY MEDICINE

## 2021-04-21 PROCEDURE — 91300 COVID-19, MRNA, LNP-S, PF, 30 MCG/0.3 ML DOSE VACCINE: CPT | Mod: PBBFAC | Performed by: FAMILY MEDICINE

## 2021-07-11 DIAGNOSIS — F41.1 GENERALIZED ANXIETY DISORDER: ICD-10-CM

## 2021-07-13 RX ORDER — ESCITALOPRAM OXALATE 20 MG/1
TABLET ORAL
Qty: 90 TABLET | Refills: 2 | Status: SHIPPED | OUTPATIENT
Start: 2021-07-13 | End: 2023-02-09 | Stop reason: SDUPTHER

## 2021-08-03 ENCOUNTER — TELEPHONE (OUTPATIENT)
Dept: INTERNAL MEDICINE | Facility: CLINIC | Age: 41
End: 2021-08-03

## 2021-08-04 ENCOUNTER — OFFICE VISIT (OUTPATIENT)
Dept: INTERNAL MEDICINE | Facility: CLINIC | Age: 41
End: 2021-08-04
Payer: COMMERCIAL

## 2021-08-04 DIAGNOSIS — F41.1 GENERALIZED ANXIETY DISORDER: Primary | ICD-10-CM

## 2021-08-04 DIAGNOSIS — Z00.00 PREVENTATIVE HEALTH CARE: ICD-10-CM

## 2021-08-04 PROCEDURE — 1160F RVW MEDS BY RX/DR IN RCRD: CPT | Mod: CPTII,,, | Performed by: PHYSICIAN ASSISTANT

## 2021-08-04 PROCEDURE — 99213 PR OFFICE/OUTPT VISIT, EST, LEVL III, 20-29 MIN: ICD-10-PCS | Mod: 95,,, | Performed by: PHYSICIAN ASSISTANT

## 2021-08-04 PROCEDURE — 99213 OFFICE O/P EST LOW 20 MIN: CPT | Mod: 95,,, | Performed by: PHYSICIAN ASSISTANT

## 2021-08-04 PROCEDURE — 1160F PR REVIEW ALL MEDS BY PRESCRIBER/CLIN PHARMACIST DOCUMENTED: ICD-10-PCS | Mod: CPTII,,, | Performed by: PHYSICIAN ASSISTANT

## 2021-08-04 PROCEDURE — 1159F PR MEDICATION LIST DOCUMENTED IN MEDICAL RECORD: ICD-10-PCS | Mod: CPTII,,, | Performed by: PHYSICIAN ASSISTANT

## 2021-08-04 PROCEDURE — 1159F MED LIST DOCD IN RCRD: CPT | Mod: CPTII,,, | Performed by: PHYSICIAN ASSISTANT

## 2021-12-29 ENCOUNTER — IMMUNIZATION (OUTPATIENT)
Dept: PRIMARY CARE CLINIC | Facility: CLINIC | Age: 41
End: 2021-12-29
Payer: COMMERCIAL

## 2021-12-29 DIAGNOSIS — Z23 NEED FOR VACCINATION: Primary | ICD-10-CM

## 2021-12-29 PROCEDURE — 91300 COVID-19, MRNA, LNP-S, PF, 30 MCG/0.3 ML DOSE VACCINE: CPT | Mod: PBBFAC | Performed by: FAMILY MEDICINE

## 2022-01-13 ENCOUNTER — TELEPHONE (OUTPATIENT)
Dept: INTERNAL MEDICINE | Facility: CLINIC | Age: 42
End: 2022-01-13
Payer: COMMERCIAL

## 2022-01-13 NOTE — TELEPHONE ENCOUNTER
----- Message from Dulce Lau sent at 1/13/2022  7:30 AM CST -----  Contact: Ez Hui would like a call back at [phone], Regards to getting orders in for a Chest Xrays.    Thanks  Td

## 2022-01-18 ENCOUNTER — TELEPHONE (OUTPATIENT)
Dept: INTERNAL MEDICINE | Facility: CLINIC | Age: 42
End: 2022-01-18
Payer: COMMERCIAL

## 2022-01-18 NOTE — TELEPHONE ENCOUNTER
----- Message from Veronique Olivo sent at 1/18/2022  8:06 AM CST -----  Contact: self [phone]  Would like to consult with nurse regarding an EKG and paperwork, please call him at [phone]. Thanks ah

## 2022-01-19 ENCOUNTER — OFFICE VISIT (OUTPATIENT)
Dept: INTERNAL MEDICINE | Facility: CLINIC | Age: 42
End: 2022-01-19
Payer: COMMERCIAL

## 2022-01-19 VITALS
HEIGHT: 70 IN | TEMPERATURE: 99 F | BODY MASS INDEX: 29.8 KG/M2 | WEIGHT: 208.13 LBS | DIASTOLIC BLOOD PRESSURE: 86 MMHG | OXYGEN SATURATION: 97 % | RESPIRATION RATE: 18 BRPM | SYSTOLIC BLOOD PRESSURE: 128 MMHG | HEART RATE: 90 BPM

## 2022-01-19 DIAGNOSIS — Z11.59 NEED FOR HEPATITIS C SCREENING TEST: ICD-10-CM

## 2022-01-19 DIAGNOSIS — Z00.00 ANNUAL PHYSICAL EXAM: Primary | ICD-10-CM

## 2022-01-19 DIAGNOSIS — Z11.4 SCREENING FOR HIV (HUMAN IMMUNODEFICIENCY VIRUS): ICD-10-CM

## 2022-01-19 DIAGNOSIS — R94.31 ABNORMAL EKG: ICD-10-CM

## 2022-01-19 DIAGNOSIS — F41.1 GENERALIZED ANXIETY DISORDER: ICD-10-CM

## 2022-01-19 PROCEDURE — 3074F SYST BP LT 130 MM HG: CPT | Mod: CPTII,S$GLB,, | Performed by: INTERNAL MEDICINE

## 2022-01-19 PROCEDURE — 3079F DIAST BP 80-89 MM HG: CPT | Mod: CPTII,S$GLB,, | Performed by: INTERNAL MEDICINE

## 2022-01-19 PROCEDURE — 1160F RVW MEDS BY RX/DR IN RCRD: CPT | Mod: CPTII,S$GLB,, | Performed by: INTERNAL MEDICINE

## 2022-01-19 PROCEDURE — 1159F PR MEDICATION LIST DOCUMENTED IN MEDICAL RECORD: ICD-10-PCS | Mod: CPTII,S$GLB,, | Performed by: INTERNAL MEDICINE

## 2022-01-19 PROCEDURE — 99395 PR PREVENTIVE VISIT,EST,18-39: ICD-10-PCS | Mod: S$GLB,,, | Performed by: INTERNAL MEDICINE

## 2022-01-19 PROCEDURE — 3079F PR MOST RECENT DIASTOLIC BLOOD PRESSURE 80-89 MM HG: ICD-10-PCS | Mod: CPTII,S$GLB,, | Performed by: INTERNAL MEDICINE

## 2022-01-19 PROCEDURE — 3008F PR BODY MASS INDEX (BMI) DOCUMENTED: ICD-10-PCS | Mod: CPTII,S$GLB,, | Performed by: INTERNAL MEDICINE

## 2022-01-19 PROCEDURE — 1159F MED LIST DOCD IN RCRD: CPT | Mod: CPTII,S$GLB,, | Performed by: INTERNAL MEDICINE

## 2022-01-19 PROCEDURE — 99395 PREV VISIT EST AGE 18-39: CPT | Mod: S$GLB,,, | Performed by: INTERNAL MEDICINE

## 2022-01-19 PROCEDURE — 3008F BODY MASS INDEX DOCD: CPT | Mod: CPTII,S$GLB,, | Performed by: INTERNAL MEDICINE

## 2022-01-19 PROCEDURE — 99999 PR PBB SHADOW E&M-EST. PATIENT-LVL IV: CPT | Mod: PBBFAC,,, | Performed by: INTERNAL MEDICINE

## 2022-01-19 PROCEDURE — 3074F PR MOST RECENT SYSTOLIC BLOOD PRESSURE < 130 MM HG: ICD-10-PCS | Mod: CPTII,S$GLB,, | Performed by: INTERNAL MEDICINE

## 2022-01-19 PROCEDURE — 1160F PR REVIEW ALL MEDS BY PRESCRIBER/CLIN PHARMACIST DOCUMENTED: ICD-10-PCS | Mod: CPTII,S$GLB,, | Performed by: INTERNAL MEDICINE

## 2022-01-19 PROCEDURE — 99999 PR PBB SHADOW E&M-EST. PATIENT-LVL IV: ICD-10-PCS | Mod: PBBFAC,,, | Performed by: INTERNAL MEDICINE

## 2022-01-20 ENCOUNTER — OFFICE VISIT (OUTPATIENT)
Dept: CARDIOLOGY | Facility: CLINIC | Age: 42
End: 2022-01-20
Payer: COMMERCIAL

## 2022-01-20 ENCOUNTER — TELEPHONE (OUTPATIENT)
Dept: INTERNAL MEDICINE | Facility: CLINIC | Age: 42
End: 2022-01-20
Payer: COMMERCIAL

## 2022-01-20 ENCOUNTER — HOSPITAL ENCOUNTER (OUTPATIENT)
Dept: CARDIOLOGY | Facility: HOSPITAL | Age: 42
Discharge: HOME | End: 2022-01-20
Payer: COMMERCIAL

## 2022-01-20 VITALS
HEIGHT: 70 IN | DIASTOLIC BLOOD PRESSURE: 84 MMHG | HEART RATE: 65 BPM | SYSTOLIC BLOOD PRESSURE: 124 MMHG | OXYGEN SATURATION: 98 % | BODY MASS INDEX: 30.3 KG/M2 | WEIGHT: 211.63 LBS

## 2022-01-20 DIAGNOSIS — R94.31 ABNORMAL EKG: ICD-10-CM

## 2022-01-20 DIAGNOSIS — R94.31 ABNORMAL EKG: Primary | ICD-10-CM

## 2022-01-20 DIAGNOSIS — E66.9 OBESITY (BMI 30.0-34.9): ICD-10-CM

## 2022-01-20 DIAGNOSIS — F41.1 GENERALIZED ANXIETY DISORDER: ICD-10-CM

## 2022-01-20 PROCEDURE — 3079F PR MOST RECENT DIASTOLIC BLOOD PRESSURE 80-89 MM HG: ICD-10-PCS | Mod: CPTII,S$GLB,, | Performed by: INTERNAL MEDICINE

## 2022-01-20 PROCEDURE — 3079F DIAST BP 80-89 MM HG: CPT | Mod: CPTII,S$GLB,, | Performed by: INTERNAL MEDICINE

## 2022-01-20 PROCEDURE — 3074F SYST BP LT 130 MM HG: CPT | Mod: CPTII,S$GLB,, | Performed by: INTERNAL MEDICINE

## 2022-01-20 PROCEDURE — 1159F PR MEDICATION LIST DOCUMENTED IN MEDICAL RECORD: ICD-10-PCS | Mod: CPTII,S$GLB,, | Performed by: INTERNAL MEDICINE

## 2022-01-20 PROCEDURE — 93010 EKG 12-LEAD: ICD-10-PCS | Mod: ,,, | Performed by: INTERNAL MEDICINE

## 2022-01-20 PROCEDURE — 99999 PR PBB SHADOW E&M-EST. PATIENT-LVL IV: ICD-10-PCS | Mod: PBBFAC,,, | Performed by: INTERNAL MEDICINE

## 2022-01-20 PROCEDURE — 99203 PR OFFICE/OUTPT VISIT, NEW, LEVL III, 30-44 MIN: ICD-10-PCS | Mod: S$GLB,,, | Performed by: INTERNAL MEDICINE

## 2022-01-20 PROCEDURE — 93005 ELECTROCARDIOGRAM TRACING: CPT

## 2022-01-20 PROCEDURE — 93010 ELECTROCARDIOGRAM REPORT: CPT | Mod: ,,, | Performed by: INTERNAL MEDICINE

## 2022-01-20 PROCEDURE — 1159F MED LIST DOCD IN RCRD: CPT | Mod: CPTII,S$GLB,, | Performed by: INTERNAL MEDICINE

## 2022-01-20 PROCEDURE — 1160F PR REVIEW ALL MEDS BY PRESCRIBER/CLIN PHARMACIST DOCUMENTED: ICD-10-PCS | Mod: CPTII,S$GLB,, | Performed by: INTERNAL MEDICINE

## 2022-01-20 PROCEDURE — 1160F RVW MEDS BY RX/DR IN RCRD: CPT | Mod: CPTII,S$GLB,, | Performed by: INTERNAL MEDICINE

## 2022-01-20 PROCEDURE — 3008F PR BODY MASS INDEX (BMI) DOCUMENTED: ICD-10-PCS | Mod: CPTII,S$GLB,, | Performed by: INTERNAL MEDICINE

## 2022-01-20 PROCEDURE — 99203 OFFICE O/P NEW LOW 30 MIN: CPT | Mod: S$GLB,,, | Performed by: INTERNAL MEDICINE

## 2022-01-20 PROCEDURE — 99999 PR PBB SHADOW E&M-EST. PATIENT-LVL IV: CPT | Mod: PBBFAC,,, | Performed by: INTERNAL MEDICINE

## 2022-01-20 PROCEDURE — 3074F PR MOST RECENT SYSTOLIC BLOOD PRESSURE < 130 MM HG: ICD-10-PCS | Mod: CPTII,S$GLB,, | Performed by: INTERNAL MEDICINE

## 2022-01-20 PROCEDURE — 3008F BODY MASS INDEX DOCD: CPT | Mod: CPTII,S$GLB,, | Performed by: INTERNAL MEDICINE

## 2022-01-20 NOTE — TELEPHONE ENCOUNTER
----- Message from Flaca Martinez LPN sent at 1/20/2022  8:54 AM CST -----  Good morning. Can someone please transmit the EKG patient had yesterday so that Dr. Cummings can review it at his appointment this morning? Thank you.

## 2022-01-20 NOTE — PROGRESS NOTES
Subjective:       Patient ID: Ez Muniz Sr. is a 41 y.o. male.    Chief Complaint: Annual Exam    41 y.o. Unknown male     Patient presents with:  Annual Exam    HPI: Here today for a check up. He recently had an EKG at his job and it was abnormal. He was advised to follow up with his PCP for clearance. EKG shows extensive ST elevation suggestive of pericarditis. He has a similar EKG from 2011. He denies symptoms. He has not been evaluated by cardiology.   Anxiety--stable on escitalopram.     Past Medical History:  Anxiety  Hypertension  Insomnia    Past Surgical History:  WISDOM TOOTH EXTRACTION    Review of patient's family history indicates:  Problem: No Known Problems      Relation: Mother          Age of Onset: (Not Specified)  Problem: No Known Problems      Relation: Father          Age of Onset: (Not Specified)      Current Outpatient Medications on File Prior to Visit:  EScitalopram oxalate (LEXAPRO) 20 MG tablet, TAKE 1 TABLET DAILY, Disp: 90 tablet, Rfl: 2  zolpidem (AMBIEN) 10 mg Tab, TK 1 T PO QD HS, Disp: , Rfl:     Allergies:   Review of patient's allergies indicates:  No Known Allergies            Review of Systems   Constitutional: Negative for activity change and unexpected weight change.   HENT: Negative for hearing loss, rhinorrhea and trouble swallowing.    Eyes: Negative for discharge and visual disturbance.   Respiratory: Negative for chest tightness, shortness of breath and wheezing.    Cardiovascular: Negative for chest pain, palpitations and leg swelling.   Gastrointestinal: Negative for blood in stool, constipation, diarrhea and vomiting.   Endocrine: Negative for polydipsia and polyuria.   Genitourinary: Negative for difficulty urinating, hematuria and urgency.   Musculoskeletal: Negative for arthralgias, joint swelling and neck pain.   Neurological: Negative for dizziness, syncope, weakness and headaches.   Psychiatric/Behavioral: Positive for sleep disturbance. Negative for  confusion and dysphoric mood. The patient is nervous/anxious.          Objective:      Physical Exam  Vitals reviewed.   Constitutional:       General: He is not in acute distress.     Appearance: He is well-developed and well-nourished. He is not ill-appearing.   Eyes:      General: No scleral icterus.  Cardiovascular:      Rate and Rhythm: Normal rate and regular rhythm.      Heart sounds: Normal heart sounds. No murmur heard.      Pulmonary:      Effort: Pulmonary effort is normal. No respiratory distress.      Breath sounds: Normal breath sounds. No wheezing or rales.   Abdominal:      General: Bowel sounds are normal.      Palpations: Abdomen is soft.   Musculoskeletal:      Right lower leg: No edema.      Left lower leg: No edema.   Skin:     General: Skin is warm and dry.   Neurological:      Mental Status: He is alert and oriented to person, place, and time.   Psychiatric:         Mood and Affect: Mood and affect normal.         Behavior: Behavior normal.         Assessment:       Problem List Items Addressed This Visit     Generalized anxiety disorder      Other Visit Diagnoses     Annual physical exam    -  Primary    Abnormal EKG        Relevant Orders    SCHEDULED EKG 12-LEAD (to Muse)    Ambulatory referral/consult to Cardiology    Screening for HIV (human immunodeficiency virus)        Relevant Orders    HIV 1/2 Ag/Ab (4th Gen)    Need for hepatitis C screening test        Relevant Orders    Hepatitis C Antibody          Plan:       Ez was seen today for annual exam.    Diagnoses and all orders for this visit:    Annual physical exam  -     Counseled regarding age appropriate screenings and immunizations  -     Counseled regarding lifestyle modifications    Abnormal EKG  -     SCHEDULED EKG 12-LEAD (to Muse); Future  -     Ambulatory referral/consult to Cardiology; Future    Generalized anxiety disorder  -     Continue escitalopram    Screening for HIV (human immunodeficiency virus)  -     HIV 1/2  Ag/Ab (4th Gen); Future    Need for hepatitis C screening test  -     Hepatitis C Antibody; Future    Schedule annual labs.

## 2022-01-20 NOTE — PROGRESS NOTES
Subjective:   Patient ID:  Ez Muniz Sr. is a 41 y.o. male who presents for evaluation of Abnormal ECG      HPI  A 42 yo male with obesity is here for evaluation of abnormal ekg done [facility]. He is asymptomatic cardiovascular wise.he snores at nite tries not to eat salt. He usually  Weight lifting not heavy aerobic exercise completely asymptomatic his ekg showed early repolarization. His diastolic bp is in the 80s . Fh htn he had sleep apnea study done none detected by his report,   Past Medical History:   Diagnosis Date    Anxiety     Hypertension     Insomnia        Past Surgical History:   Procedure Laterality Date    WISDOM TOOTH EXTRACTION         Social History     Tobacco Use    Smoking status: Never Smoker    Smokeless tobacco: Never Used   Substance Use Topics    Alcohol use: Yes    Drug use: No       Family History   Problem Relation Age of Onset    No Known Problems Mother     No Known Problems Father        Current Outpatient Medications   Medication Sig    EScitalopram oxalate (LEXAPRO) 20 MG tablet TAKE 1 TABLET DAILY    zolpidem (AMBIEN) 10 mg Tab TK 1 T PO QD HS     No current facility-administered medications for this visit.     Current Outpatient Medications on File Prior to Visit   Medication Sig    EScitalopram oxalate (LEXAPRO) 20 MG tablet TAKE 1 TABLET DAILY    zolpidem (AMBIEN) 10 mg Tab TK 1 T PO QD HS     No current facility-administered medications on file prior to visit.       Review of patient's allergies indicates:  No Known Allergies    Review of Systems   Constitutional: Negative for malaise/fatigue.   Eyes: Negative for blurred vision.   Cardiovascular: Negative for chest pain, claudication, cyanosis, dyspnea on exertion, irregular heartbeat, leg swelling, near-syncope, orthopnea, palpitations and paroxysmal nocturnal dyspnea.   Respiratory: Positive for snoring. Negative for cough, hemoptysis and shortness of breath.    Hematologic/Lymphatic: Negative for  bleeding problem. Does not bruise/bleed easily.   Skin: Negative for dry skin and itching.   Musculoskeletal: Negative for falls, muscle weakness and myalgias.   Gastrointestinal: Negative for abdominal pain, diarrhea, heartburn, hematemesis, hematochezia and melena.   Genitourinary: Negative for flank pain and hematuria.   Neurological: Negative for dizziness, focal weakness, headaches, light-headedness, numbness, paresthesias, seizures and weakness.   Psychiatric/Behavioral: Negative for altered mental status and memory loss. The patient is not nervous/anxious.    Allergic/Immunologic: Negative for hives.       Objective:   Physical Exam  Vitals and nursing note reviewed.   Constitutional:       General: He is not in acute distress.     Appearance: He is well-developed and well-nourished. He is not diaphoretic.   HENT:      Head: Normocephalic and atraumatic.   Eyes:      General:         Right eye: No discharge.         Left eye: No discharge.      Extraocular Movements: EOM normal.      Pupils: Pupils are equal, round, and reactive to light.   Neck:      Thyroid: No thyromegaly.      Vascular: No carotid bruit or JVD.   Cardiovascular:      Rate and Rhythm: Regular rhythm. Bradycardia present.      Pulses: Normal pulses and intact distal pulses.      Heart sounds: Normal heart sounds. No murmur heard.  No friction rub. No gallop.    Pulmonary:      Effort: Pulmonary effort is normal. No respiratory distress.      Breath sounds: Normal breath sounds. No wheezing or rales.   Chest:      Chest wall: No tenderness.   Abdominal:      General: Bowel sounds are normal. There is no distension.      Palpations: Abdomen is soft.      Tenderness: There is no abdominal tenderness.   Musculoskeletal:         General: No edema. Normal range of motion.      Cervical back: Neck supple.      Right lower leg: No edema.      Left lower leg: No edema.   Skin:     General: Skin is warm and dry.      Findings: No erythema or rash.  "  Neurological:      Mental Status: He is alert and oriented to person, place, and time.      Cranial Nerves: No cranial nerve deficit.   Psychiatric:         Mood and Affect: Mood and affect normal.         Behavior: Behavior normal.         Judgment: Judgment normal.       Vitals:    01/20/22 1008 01/20/22 1012   BP: 130/88 124/84   BP Location: Left arm Right arm   Patient Position: Sitting Sitting   BP Method: Large (Manual) Large (Manual)   Pulse: 65 65   SpO2: 98%    Weight: 96 kg (211 lb 10.3 oz)    Height: 5' 10" (1.778 m)      Lab Results   Component Value Date    CHOL 182 12/02/2020    CHOL 197 05/21/2019     Lab Results   Component Value Date    HDL 61 12/02/2020    HDL 52 05/21/2019     Lab Results   Component Value Date    LDLCALC 111.2 12/02/2020    LDLCALC 133.4 05/21/2019     Lab Results   Component Value Date    TRIG 49 12/02/2020    TRIG 58 05/21/2019     Lab Results   Component Value Date    CHOLHDL 33.5 12/02/2020    CHOLHDL 26.4 05/21/2019       Chemistry        Component Value Date/Time     12/02/2020 0853    K 4.0 12/02/2020 0853     12/02/2020 0853    CO2 29 12/02/2020 0853    BUN 15 12/02/2020 0853    CREATININE 1.1 12/02/2020 0853    GLU 80 12/02/2020 0853        Component Value Date/Time    CALCIUM 9.1 12/02/2020 0853    ALKPHOS 62 12/02/2020 0853    AST 22 12/02/2020 0853    ALT 18 12/02/2020 0853    BILITOT 0.3 12/02/2020 0853    ESTGFRAFRICA >60.0 12/02/2020 0853    EGFRNONAA >60.0 12/02/2020 0853          Lab Results   Component Value Date    TSH 2.012 12/02/2020     No results found for: INR, PROTIME  Lab Results   Component Value Date    WBC 3.89 (L) 12/02/2020    HGB 14.2 12/02/2020    HCT 43.3 12/02/2020    MCV 90 12/02/2020     12/02/2020     BNP  @LABRCNTIP(BNP,BNPTRIAGEBLO)@  CrCl cannot be calculated (Patient's most recent lab result is older than the maximum 7 days allowed.).  Assessment:     1. Abnormal EKG    2. Obesity (BMI 30.0-34.9)    3. Generalized " anxiety disorder    has early repolarization on ekg with sinus bradycardia of no concern clinically.   Has no restriction to perform his job  The only concern his diastolic bp is elevated he may be in early stages of htn counseled about weight loss low salt diet exercise and will get him to keep a bp log and discuss with DR AGUILAR.     Plan:     As per above

## 2022-01-21 ENCOUNTER — PATIENT MESSAGE (OUTPATIENT)
Dept: INTERNAL MEDICINE | Facility: CLINIC | Age: 42
End: 2022-01-21
Payer: COMMERCIAL

## 2022-03-04 ENCOUNTER — OFFICE VISIT (OUTPATIENT)
Dept: INTERNAL MEDICINE | Facility: CLINIC | Age: 42
End: 2022-03-04
Payer: COMMERCIAL

## 2022-03-04 VITALS
DIASTOLIC BLOOD PRESSURE: 80 MMHG | SYSTOLIC BLOOD PRESSURE: 112 MMHG | HEIGHT: 70 IN | HEART RATE: 86 BPM | BODY MASS INDEX: 30.39 KG/M2 | OXYGEN SATURATION: 98 % | RESPIRATION RATE: 18 BRPM | TEMPERATURE: 98 F | WEIGHT: 212.31 LBS

## 2022-03-04 DIAGNOSIS — F41.1 GENERALIZED ANXIETY DISORDER: Primary | ICD-10-CM

## 2022-03-04 DIAGNOSIS — D72.819 LEUKOPENIA, UNSPECIFIED TYPE: ICD-10-CM

## 2022-03-04 DIAGNOSIS — D64.9 MILD ANEMIA: ICD-10-CM

## 2022-03-04 PROCEDURE — 99214 PR OFFICE/OUTPT VISIT, EST, LEVL IV, 30-39 MIN: ICD-10-PCS | Mod: S$GLB,,, | Performed by: INTERNAL MEDICINE

## 2022-03-04 PROCEDURE — 1159F MED LIST DOCD IN RCRD: CPT | Mod: CPTII,S$GLB,, | Performed by: INTERNAL MEDICINE

## 2022-03-04 PROCEDURE — 99999 PR PBB SHADOW E&M-EST. PATIENT-LVL III: ICD-10-PCS | Mod: PBBFAC,,, | Performed by: INTERNAL MEDICINE

## 2022-03-04 PROCEDURE — 3079F DIAST BP 80-89 MM HG: CPT | Mod: CPTII,S$GLB,, | Performed by: INTERNAL MEDICINE

## 2022-03-04 PROCEDURE — 1160F RVW MEDS BY RX/DR IN RCRD: CPT | Mod: CPTII,S$GLB,, | Performed by: INTERNAL MEDICINE

## 2022-03-04 PROCEDURE — 3008F BODY MASS INDEX DOCD: CPT | Mod: CPTII,S$GLB,, | Performed by: INTERNAL MEDICINE

## 2022-03-04 PROCEDURE — 3044F HG A1C LEVEL LT 7.0%: CPT | Mod: CPTII,S$GLB,, | Performed by: INTERNAL MEDICINE

## 2022-03-04 PROCEDURE — 3008F PR BODY MASS INDEX (BMI) DOCUMENTED: ICD-10-PCS | Mod: CPTII,S$GLB,, | Performed by: INTERNAL MEDICINE

## 2022-03-04 PROCEDURE — 3074F SYST BP LT 130 MM HG: CPT | Mod: CPTII,S$GLB,, | Performed by: INTERNAL MEDICINE

## 2022-03-04 PROCEDURE — 3079F PR MOST RECENT DIASTOLIC BLOOD PRESSURE 80-89 MM HG: ICD-10-PCS | Mod: CPTII,S$GLB,, | Performed by: INTERNAL MEDICINE

## 2022-03-04 PROCEDURE — 1159F PR MEDICATION LIST DOCUMENTED IN MEDICAL RECORD: ICD-10-PCS | Mod: CPTII,S$GLB,, | Performed by: INTERNAL MEDICINE

## 2022-03-04 PROCEDURE — 1160F PR REVIEW ALL MEDS BY PRESCRIBER/CLIN PHARMACIST DOCUMENTED: ICD-10-PCS | Mod: CPTII,S$GLB,, | Performed by: INTERNAL MEDICINE

## 2022-03-04 PROCEDURE — 3044F PR MOST RECENT HEMOGLOBIN A1C LEVEL <7.0%: ICD-10-PCS | Mod: CPTII,S$GLB,, | Performed by: INTERNAL MEDICINE

## 2022-03-04 PROCEDURE — 99214 OFFICE O/P EST MOD 30 MIN: CPT | Mod: S$GLB,,, | Performed by: INTERNAL MEDICINE

## 2022-03-04 PROCEDURE — 3074F PR MOST RECENT SYSTOLIC BLOOD PRESSURE < 130 MM HG: ICD-10-PCS | Mod: CPTII,S$GLB,, | Performed by: INTERNAL MEDICINE

## 2022-03-04 PROCEDURE — 99999 PR PBB SHADOW E&M-EST. PATIENT-LVL III: CPT | Mod: PBBFAC,,, | Performed by: INTERNAL MEDICINE

## 2022-03-04 NOTE — MEDICAL/APP STUDENT
Subjective:       Patient ID: Ez Muniz Sr. is a 41 y.o. male.    Chief Complaint: Follow-up    Pt. With pmh anxiety, and insomnia presents to clinic for 6 month follow-up. Of note, pt. Had recent ekg at work as part of a physical which showed early repolarization. Pt. Seen by Dr. Cummings with cardiology. Pt. Denies sob, chest pain, nausea, vomiting, diaphoresis. Pt. Also states he has discontinued ambien.     Review of Systems   Constitutional: Negative.    HENT: Negative.    Eyes: Negative.    Respiratory: Negative.    Cardiovascular: Negative.    Gastrointestinal: Negative.    Endocrine: Negative.    Genitourinary: Negative.    Musculoskeletal: Negative.    Integumentary:  Negative.   Allergic/Immunologic: Negative.    Neurological: Negative.    Hematological: Negative.    Psychiatric/Behavioral: Negative.         +anxiety history. Pt. States well-controlled with lexapro.          Objective:      Physical Exam  Constitutional:       Appearance: Normal appearance. He is normal weight.   HENT:      Head: Normocephalic and atraumatic.      Right Ear: External ear normal.      Left Ear: External ear normal.      Nose: Nose normal.      Mouth/Throat:      Mouth: Mucous membranes are moist.      Pharynx: Oropharynx is clear.   Eyes:      Conjunctiva/sclera: Conjunctivae normal.      Pupils: Pupils are equal, round, and reactive to light.   Cardiovascular:      Rate and Rhythm: Normal rate and regular rhythm.      Pulses: Normal pulses.      Heart sounds: Normal heart sounds.   Pulmonary:      Effort: Pulmonary effort is normal.      Breath sounds: Normal breath sounds.   Abdominal:      General: Abdomen is flat. Bowel sounds are normal.      Palpations: Abdomen is soft.   Musculoskeletal:         General: Normal range of motion.      Cervical back: Normal range of motion and neck supple.   Skin:     General: Skin is warm and dry.      Capillary Refill: Capillary refill takes less than 2 seconds.   Neurological:       General: No focal deficit present.      Mental Status: He is alert and oriented to person, place, and time.   Psychiatric:         Mood and Affect: Mood normal.         Behavior: Behavior normal.         Thought Content: Thought content normal.         Judgment: Judgment normal.         Assessment:     6 month follow-up exam   Problem List Items Addressed This Visit    None         Plan:     continue medications as directed

## 2022-03-04 NOTE — PROGRESS NOTES
Ez Garnica Muniz .  41 y.o. Unknown male  [id number]    Chief Complaint:  Chief Complaint   Patient presents with    Follow-up       History of Present Illness:  Presents to the clinic to follow up on anxiety. He is stable on escitalopram. He has not had any issues recently. He has not taken zolpidem in a while and has been sleeping okay without it.     Laboratory:  Lab Results   Component Value Date    WBC 2.48 (L) 01/20/2022    HGB 13.0 (L) 01/20/2022    HCT 40.3 01/20/2022     01/20/2022    CHOL 195 01/20/2022    TRIG 37 01/20/2022    HDL 57 01/20/2022    ALT 17 01/20/2022    AST 21 01/20/2022     01/20/2022    K 4.8 01/20/2022     01/20/2022    CREATININE 0.9 01/20/2022    BUN 21 (H) 01/20/2022    CO2 25 01/20/2022    TSH 0.769 01/20/2022    HGBA1C 5.5 01/20/2022       History:  Past Medical History:   Diagnosis Date    Anxiety     Hypertension     Insomnia        Medications:  Current Outpatient Medications on File Prior to Visit   Medication Sig Dispense Refill    EScitalopram oxalate (LEXAPRO) 20 MG tablet TAKE 1 TABLET DAILY 90 tablet 2    zolpidem (AMBIEN) 10 mg Tab TK 1 T PO QD HS       No current facility-administered medications on file prior to visit.       Allergies:  Review of patient's allergies indicates:  No Known Allergies    Review of Systems   Constitutional: Negative for fever and weight loss.   Respiratory: Negative for shortness of breath.    Cardiovascular: Negative for chest pain.   Gastrointestinal: Negative for abdominal pain.   Neurological: Negative for dizziness and headaches.   Psychiatric/Behavioral: The patient is nervous/anxious. The patient does not have insomnia.        Exam:  Vitals:    03/04/22 1017   BP: 112/80   Pulse: 86   Resp: 18   Temp: 98.3 °F (36.8 °C)     Weight: 96.3 kg (212 lb 4.9 oz)   Body mass index is 30.46 kg/m².      Physical Exam  Vitals reviewed.   Constitutional:       General: He is not in acute distress.     Appearance: He is  well-developed. He is not ill-appearing.   Eyes:      General: No scleral icterus.  Cardiovascular:      Rate and Rhythm: Normal rate and regular rhythm.      Heart sounds: Normal heart sounds.   Pulmonary:      Effort: Pulmonary effort is normal. No respiratory distress.      Breath sounds: Normal breath sounds. No wheezing or rales.   Abdominal:      General: Bowel sounds are normal.      Palpations: Abdomen is soft.   Skin:     General: Skin is warm and dry.   Neurological:      Mental Status: He is alert and oriented to person, place, and time.   Psychiatric:         Behavior: Behavior normal.       Ez was seen today for follow-up.    Diagnoses and all orders for this visit:    Generalized anxiety disorder  -     Continue escitalopram    Mild anemia  -     CBC Auto Differential; Future  -     Ferritin; Future  -     Iron and TIBC; Future    Leukopenia, unspecified type  -     CBC Auto Differential; Future    Schedule labs.     Follow up in about 6 months (around 9/4/2022).

## 2022-03-08 ENCOUNTER — TELEPHONE (OUTPATIENT)
Dept: PRIMARY CARE CLINIC | Facility: CLINIC | Age: 42
End: 2022-03-08
Payer: COMMERCIAL

## 2022-03-08 NOTE — TELEPHONE ENCOUNTER
----- Message from Lin Villagomez DO sent at 3/7/2022  8:02 AM CST -----  Schedule CBC, iron and ferritin.

## 2022-07-21 ENCOUNTER — PATIENT MESSAGE (OUTPATIENT)
Dept: INTERNAL MEDICINE | Facility: CLINIC | Age: 42
End: 2022-07-21
Payer: COMMERCIAL

## 2022-08-05 ENCOUNTER — PATIENT MESSAGE (OUTPATIENT)
Dept: INTERNAL MEDICINE | Facility: CLINIC | Age: 42
End: 2022-08-05
Payer: COMMERCIAL

## 2022-09-28 ENCOUNTER — PATIENT MESSAGE (OUTPATIENT)
Dept: INTERNAL MEDICINE | Facility: CLINIC | Age: 42
End: 2022-09-28
Payer: COMMERCIAL

## 2022-10-13 ENCOUNTER — OFFICE VISIT (OUTPATIENT)
Dept: INTERNAL MEDICINE | Facility: CLINIC | Age: 42
End: 2022-10-13
Payer: COMMERCIAL

## 2022-10-13 DIAGNOSIS — R53.83 FATIGUE, UNSPECIFIED TYPE: Primary | ICD-10-CM

## 2022-10-13 DIAGNOSIS — D64.9 NORMOCYTIC ANEMIA: ICD-10-CM

## 2022-10-13 PROCEDURE — 1159F PR MEDICATION LIST DOCUMENTED IN MEDICAL RECORD: ICD-10-PCS | Mod: CPTII,95,, | Performed by: INTERNAL MEDICINE

## 2022-10-13 PROCEDURE — 3044F PR MOST RECENT HEMOGLOBIN A1C LEVEL <7.0%: ICD-10-PCS | Mod: CPTII,95,, | Performed by: INTERNAL MEDICINE

## 2022-10-13 PROCEDURE — 99214 OFFICE O/P EST MOD 30 MIN: CPT | Mod: 95,,, | Performed by: INTERNAL MEDICINE

## 2022-10-13 PROCEDURE — 3044F HG A1C LEVEL LT 7.0%: CPT | Mod: CPTII,95,, | Performed by: INTERNAL MEDICINE

## 2022-10-13 PROCEDURE — 1159F MED LIST DOCD IN RCRD: CPT | Mod: CPTII,95,, | Performed by: INTERNAL MEDICINE

## 2022-10-13 PROCEDURE — 1160F PR REVIEW ALL MEDS BY PRESCRIBER/CLIN PHARMACIST DOCUMENTED: ICD-10-PCS | Mod: CPTII,95,, | Performed by: INTERNAL MEDICINE

## 2022-10-13 PROCEDURE — 99214 PR OFFICE/OUTPT VISIT, EST, LEVL IV, 30-39 MIN: ICD-10-PCS | Mod: 95,,, | Performed by: INTERNAL MEDICINE

## 2022-10-13 PROCEDURE — 1160F RVW MEDS BY RX/DR IN RCRD: CPT | Mod: CPTII,95,, | Performed by: INTERNAL MEDICINE

## 2022-10-13 NOTE — Clinical Note
CBC, iron, ferritin, B 12, folate, vitamin D tomorrow at [facility]. Pt wants labs at 7 or 7:30

## 2022-10-13 NOTE — PROGRESS NOTES
Subjective:       Patient ID: Ez Muniz Sr. is a 42 y.o. male.    Chief Complaint: Fatigue      Ez Muniz Sr.  42 y.o. Unknown male    Patient presents with:  Fatigue    HPI: Presents for a telemedicine visit.   The patient location is: [place]   The chief complaint leading to consultation is: fatigue     Visit type: audiovisual    Face to Face time with patient: 10 minutes   10 minutes of total time spent on the encounter, which includes face to face time and non-face to face time preparing to see the patient (eg, review of tests), Obtaining and/or reviewing separately obtained history, Documenting clinical information in the electronic or other health record, Independently interpreting results (not separately reported) and communicating results to the patient/family/caregiver, or Care coordination (not separately reported).         Each patient to whom he or she provides medical services by telemedicine is:  (1) informed of the relationship between the physician and patient and the respective role of any other health care provider with respect to management of the patient; and (2) notified that he or she may decline to receive medical services by telemedicine and may withdraw from such care at any time.    Notes:   Reports long standing fatigue, but seems to be getting worse. Gets adequate sleep, follows a balanced diet and exercises regularly. Had a sleep study 4-5 years ago at an outside facility and was told it was normal.   Denies depression.   Takes prescribed medication. Takes a MVI.   Last labs show normocytic anemia.     Past Medical History:  Anxiety  Hypertension  Insomnia    Current Outpatient Medications on File Prior to Visit:  EScitalopram oxalate (LEXAPRO) 20 MG tablet, TAKE 1 TABLET DAILY, Disp: 90 tablet, Rfl: 2  zolpidem (AMBIEN) 10 mg Tab, TK 1 T PO QD HS, Disp: , Rfl:     Allergies:  Review of patient's allergies indicates:  No Known Allergies        Review of Systems    Constitutional:  Positive for fatigue. Negative for activity change and unexpected weight change.   HENT:  Negative for hearing loss, rhinorrhea and trouble swallowing.    Eyes:  Negative for discharge and visual disturbance.   Respiratory:  Negative for chest tightness and wheezing.    Cardiovascular:  Negative for chest pain and palpitations.   Gastrointestinal:  Negative for blood in stool, constipation, diarrhea and vomiting.   Endocrine: Negative for polydipsia and polyuria.   Genitourinary:  Negative for difficulty urinating, hematuria and urgency.   Musculoskeletal:  Negative for arthralgias, joint swelling and neck pain.   Neurological:  Negative for weakness and headaches.   Psychiatric/Behavioral:  Negative for confusion, dysphoric mood and sleep disturbance.        Objective:      Physical Exam  Constitutional:       General: He is not in acute distress.     Appearance: He is well-developed. He is not ill-appearing.   Pulmonary:      Effort: Pulmonary effort is normal. No respiratory distress.   Neurological:      Mental Status: He is alert and oriented to person, place, and time.   Psychiatric:         Behavior: Behavior normal.         Thought Content: Thought content normal.         Judgment: Judgment normal.       Assessment:       Problem List Items Addressed This Visit    None    Plan:       Ez was seen today for fatigue.    Diagnoses and all orders for this visit:    Fatigue, unspecified type  -     Iron and TIBC; Future  -     Ferritin; Future  -     Vitamin B12; Future  -     FOLATE; Future  -     Vitamin D; Future  -     CBC Auto Differential; Future    Normocytic anemia  -     Iron and TIBC; Future  -     Ferritin; Future  -     Vitamin B12; Future  -     FOLATE; Future  -     CBC Auto Differential; Future    Schedule labs.

## 2022-10-14 ENCOUNTER — PATIENT MESSAGE (OUTPATIENT)
Dept: INTERNAL MEDICINE | Facility: CLINIC | Age: 42
End: 2022-10-14
Payer: COMMERCIAL

## 2022-10-14 ENCOUNTER — LAB VISIT (OUTPATIENT)
Dept: LAB | Facility: HOSPITAL | Age: 42
End: 2022-10-14
Attending: INTERNAL MEDICINE
Payer: COMMERCIAL

## 2022-10-14 DIAGNOSIS — D64.9 NORMOCYTIC ANEMIA: ICD-10-CM

## 2022-10-14 DIAGNOSIS — R53.83 FATIGUE, UNSPECIFIED TYPE: ICD-10-CM

## 2022-10-14 DIAGNOSIS — E61.1 IRON DEFICIENCY: Primary | ICD-10-CM

## 2022-10-14 LAB
25(OH)D3+25(OH)D2 SERPL-MCNC: 28 NG/ML (ref 30–96)
BASOPHILS # BLD AUTO: 0.02 K/UL (ref 0–0.2)
BASOPHILS NFR BLD: 0.6 % (ref 0–1.9)
DIFFERENTIAL METHOD: ABNORMAL
EOSINOPHIL # BLD AUTO: 0 K/UL (ref 0–0.5)
EOSINOPHIL NFR BLD: 1.2 % (ref 0–8)
ERYTHROCYTE [DISTWIDTH] IN BLOOD BY AUTOMATED COUNT: 13.8 % (ref 11.5–14.5)
FERRITIN SERPL-MCNC: 27 NG/ML (ref 20–300)
FOLATE SERPL-MCNC: 15.8 NG/ML (ref 4–24)
HCT VFR BLD AUTO: 44 % (ref 40–54)
HGB BLD-MCNC: 14.2 G/DL (ref 14–18)
IMM GRANULOCYTES # BLD AUTO: 0.02 K/UL (ref 0–0.04)
IMM GRANULOCYTES NFR BLD AUTO: 0.6 % (ref 0–0.5)
IRON SERPL-MCNC: 62 UG/DL (ref 45–160)
LYMPHOCYTES # BLD AUTO: 1.6 K/UL (ref 1–4.8)
LYMPHOCYTES NFR BLD: 48 % (ref 18–48)
MCH RBC QN AUTO: 28.5 PG (ref 27–31)
MCHC RBC AUTO-ENTMCNC: 32.3 G/DL (ref 32–36)
MCV RBC AUTO: 88 FL (ref 82–98)
MONOCYTES # BLD AUTO: 0.2 K/UL (ref 0.3–1)
MONOCYTES NFR BLD: 6.8 % (ref 4–15)
NEUTROPHILS # BLD AUTO: 1.4 K/UL (ref 1.8–7.7)
NEUTROPHILS NFR BLD: 42.8 % (ref 38–73)
NRBC BLD-RTO: 0 /100 WBC
PLATELET # BLD AUTO: 295 K/UL (ref 150–450)
PMV BLD AUTO: 9.8 FL (ref 9.2–12.9)
RBC # BLD AUTO: 4.99 M/UL (ref 4.6–6.2)
SATURATED IRON: 14 % (ref 20–50)
TOTAL IRON BINDING CAPACITY: 443 UG/DL (ref 250–450)
TRANSFERRIN SERPL-MCNC: 299 MG/DL (ref 200–375)
VIT B12 SERPL-MCNC: 521 PG/ML (ref 210–950)
WBC # BLD AUTO: 3.23 K/UL (ref 3.9–12.7)

## 2022-10-14 PROCEDURE — 82306 VITAMIN D 25 HYDROXY: CPT | Performed by: INTERNAL MEDICINE

## 2022-10-14 PROCEDURE — 82746 ASSAY OF FOLIC ACID SERUM: CPT | Performed by: INTERNAL MEDICINE

## 2022-10-14 PROCEDURE — 82607 VITAMIN B-12: CPT | Performed by: INTERNAL MEDICINE

## 2022-10-14 PROCEDURE — 36415 COLL VENOUS BLD VENIPUNCTURE: CPT | Mod: PO | Performed by: INTERNAL MEDICINE

## 2022-10-14 PROCEDURE — 84466 ASSAY OF TRANSFERRIN: CPT | Performed by: INTERNAL MEDICINE

## 2022-10-14 PROCEDURE — 85025 COMPLETE CBC W/AUTO DIFF WBC: CPT | Performed by: INTERNAL MEDICINE

## 2022-10-14 PROCEDURE — 82728 ASSAY OF FERRITIN: CPT | Performed by: INTERNAL MEDICINE

## 2023-01-31 ENCOUNTER — PATIENT MESSAGE (OUTPATIENT)
Dept: INTERNAL MEDICINE | Facility: CLINIC | Age: 43
End: 2023-01-31
Payer: COMMERCIAL

## 2023-01-31 DIAGNOSIS — R61 HYPERHIDROSIS: Primary | ICD-10-CM

## 2023-02-09 DIAGNOSIS — F41.1 GENERALIZED ANXIETY DISORDER: ICD-10-CM

## 2023-02-09 RX ORDER — ESCITALOPRAM OXALATE 20 MG/1
20 TABLET ORAL DAILY
Qty: 90 TABLET | Refills: 2 | Status: SHIPPED | OUTPATIENT
Start: 2023-02-09

## 2023-02-09 NOTE — TELEPHONE ENCOUNTER
No new care gaps identified.  [facility] Embedded Care Gaps. Reference number: [id number]. 2/09/2023   3:51:44 PM CST

## 2023-02-09 NOTE — TELEPHONE ENCOUNTER
Refill Decision Note   Ez Muniz  is requesting a refill authorization.  Brief Assessment and Rationale for Refill:  Approve     Medication Therapy Plan:       Medication Reconciliation Completed: No   Comments:     No Care Gaps recommended.     Note composed:4:55 PM 02/09/2023

## 2023-03-22 ENCOUNTER — PATIENT MESSAGE (OUTPATIENT)
Dept: INTERNAL MEDICINE | Facility: CLINIC | Age: 43
End: 2023-03-22
Payer: COMMERCIAL

## 2023-03-22 DIAGNOSIS — E66.9 OBESITY (BMI 30.0-34.9): Primary | ICD-10-CM

## 2023-03-23 ENCOUNTER — OFFICE VISIT (OUTPATIENT)
Dept: DERMATOLOGY | Facility: CLINIC | Age: 43
End: 2023-03-23
Payer: COMMERCIAL

## 2023-03-23 DIAGNOSIS — R61 HYPERHIDROSIS: Primary | ICD-10-CM

## 2023-03-23 PROCEDURE — 99999 PR PBB SHADOW E&M-EST. PATIENT-LVL III: CPT | Mod: PBBFAC,,, | Performed by: DERMATOLOGY

## 2023-03-23 PROCEDURE — 99999 PR PBB SHADOW E&M-EST. PATIENT-LVL III: ICD-10-PCS | Mod: PBBFAC,,, | Performed by: DERMATOLOGY

## 2023-03-23 PROCEDURE — 99204 OFFICE O/P NEW MOD 45 MIN: CPT | Mod: S$GLB,,, | Performed by: DERMATOLOGY

## 2023-03-23 PROCEDURE — 1160F PR REVIEW ALL MEDS BY PRESCRIBER/CLIN PHARMACIST DOCUMENTED: ICD-10-PCS | Mod: CPTII,S$GLB,, | Performed by: DERMATOLOGY

## 2023-03-23 PROCEDURE — 1159F MED LIST DOCD IN RCRD: CPT | Mod: CPTII,S$GLB,, | Performed by: DERMATOLOGY

## 2023-03-23 PROCEDURE — 99204 PR OFFICE/OUTPT VISIT, NEW, LEVL IV, 45-59 MIN: ICD-10-PCS | Mod: S$GLB,,, | Performed by: DERMATOLOGY

## 2023-03-23 PROCEDURE — 1160F RVW MEDS BY RX/DR IN RCRD: CPT | Mod: CPTII,S$GLB,, | Performed by: DERMATOLOGY

## 2023-03-23 PROCEDURE — 1159F PR MEDICATION LIST DOCUMENTED IN MEDICAL RECORD: ICD-10-PCS | Mod: CPTII,S$GLB,, | Performed by: DERMATOLOGY

## 2023-03-23 RX ORDER — ALUMINUM CHLORIDE 20 %
SOLUTION, NON-ORAL TOPICAL NIGHTLY
Qty: 60 ML | Refills: 5 | Status: SHIPPED | OUTPATIENT
Start: 2023-03-23

## 2023-03-23 NOTE — PATIENT INSTRUCTIONS
- recommend nighttime application of OTC AlCl wipes (Sweat Shield Ultra Clinical Strength Antiperspirant) or gel (Hydrosal Gel) to dry skin and then wash off in AM prior to applying a clinical strength deodorant/antiperspirant (Vanicream Antiperspirant Deodorant Clinical Strength if very sensitive skin, or Carpe Antiperspirant Lotion for underarms/hands/feet/face/breast, or powder to groin)        Iontophoresis   - warm water is less uncomfortable  - start using nightly for 15 minutes, work your way up to the highest energy level  - use for at least 2 weeks to see response  - may be able to decrease use to 1-2 times a week eventually  - could consider adding a crushed tablet of glycopyrrolate to the water    RA Radha (~$900-$1000)  [email or web address]  Has a form you can fill out to see if insurance will cover it    Dermlucyry (~$350-$500)  [email or web address]    Anna (~$300)  [email or web address]

## 2023-03-23 NOTE — PROGRESS NOTES
Subjective:       Patient ID:  Ez Muniz Sr. is a 42 y.o. male who presents for No chief complaint on file.    History of Present Illness: The patient presents with chief complaint of excessive sweating.  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   Skin:  Negative for itching and rash.      Objective:    Physical Exam   Constitutional: He appears well-developed and well-nourished. No distress.   Neurological: He is alert and oriented to person, place, and time. He is not disoriented.   Psychiatric: He has a normal mood and affect.   Skin:   Areas Examined (abnormalities noted in diagram):   Nails and Digits Inspection Performed           Diagram Legend     Erythematous scaling macule/papule c/w actinic keratosis       Vascular papule c/w angioma      Pigmented verrucoid papule/plaque c/w seborrheic keratosis      Yellow umbilicated papule c/w sebaceous hyperplasia      Irregularly shaped tan macule c/w lentigo     1-2 mm smooth white papules consistent with Milia      Movable subcutaneous cyst with punctum c/w epidermal inclusion cyst      Subcutaneous movable cyst c/w pilar cyst      Firm pink to brown papule c/w dermatofibroma      Pedunculated fleshy papule(s) c/w skin tag(s)      Evenly pigmented macule c/w junctional nevus     Mildly variegated pigmented, slightly irregular-bordered macule c/w mildly atypical nevus      Flesh colored to evenly pigmented papule c/w intradermal nevus       Pink pearly papule/plaque c/w basal cell carcinoma      Erythematous hyperkeratotic cursted plaque c/w SCC      Surgical scar with no sign of skin cancer recurrence      Open and closed comedones      Inflammatory papules and pustules      Verrucoid papule consistent consistent with wart      Erythematous eczematous patches and plaques     Dystrophic onycholytic nail with subungual debris c/w onychomycosis     Umbilicated papule    Erythematous-base heme-crusted tan verrucoid plaque consistent with inflamed seborrheic keratosis     Erythematous Silvery Scaling Plaque c/w Psoriasis     See annotation      Assessment / Plan:        Hyperhidrosis  - severe, interfering with quality of life and occupation  -discussed etiology and nature of condition, management options  - rx'd Drysol. If unable to get rx Drysol, recommend nighttime application of OTC AlCl wipes (Sweat Shield Ultra Clinical Strength Antiperspirant) or gel (Hydrosal Gel) to dry skin and then wash off in AM   - recommend applying Carpe Antiperspirant Lotion for hands/feet morning and night    - also discussed Botox, oral robinul, iontophoresis (see below)      Oral glycopyrrolate - avoid use if you have glaucoma, obstructive uropathy, BPH, UC, myasthenia gravis, high environmental temperature, caution in renal impairment. Discussed potential adverse reactions including heat stroke, dry mouth/eyes, constipation, urinary retention/hesitancy, lactation suppression, blurred vision/taste change      Iontophoresis   - warm water is less uncomfortable  - start using nightly for 15 minutes, work your way up to the highest energy level  - use for at least 2 weeks to see response  - may be able to decrease use to 1-2 times a week eventually  - could consider adding a crushed tablet of glycopyrrolate to the water    ALISHA Garcia (~$900-$1000)  [email or web address]  Has a form you can fill out to see if insurance will cover it    Dermadry (~$350-$500)  [email or web address]    Anna (~$300)  [email or web address]             RTC prn        LOS NUMBER AND COMPLEXITY OF PROBLEMS    COMPLEXITY OF DATA RISK TOTAL TIME (m)   75378  [id number] [] 1 self-limited or minor problem [x] Minimal to none [] No  treatment recommended or patient to monitor. Reassurance.  15-29  10-19   51010  28357 Low  [] 2 or more self limited or minor problems  [] 1 stable chronic illness  [] 1 acute, uncomplicated illness or injury Limited (2)  [] Prior external notes from each unique source  [] Review result of each unique test  [] Order each unique test  OR [] Independent historian Low  []  OTC medications   []  Discussed/Decision for minor skin surgery (no risk factors) 30-44  20-29   67367  85148 Moderate  [x]  1 or more chronic unstable illness (not at goal or progression or exacerbation) or SE of treatment  []  2 or more stable chronic illnesses  []  1 acute illness with systemic symptoms  []  1 acute complicated injury  []  1 undiagnosed new problem with uncertain prognosis Moderate (1/3 below)  []  3 or more data items        *Now includes independent historian  []  Independent interpretation of a test  []  Discuss management/test with another provider Moderate  [x]  Prescription drug mgmt  []  Discussed/Decision for Minor surgery with risk factors  []  Mgmt limited by social determinates 45-59  30-39   04230  60295 High  []  1 or more chronic illness with severe exacerbation, progression or SE of treatment  []  1 acute or chronic illness/injury that poses a threat to life or bodily function Extensive (2/3 below)  []  3 or more data items        *Now includes independent historian.  []  Independent interpretation of a test  []  Discuss management/test with another provider High  []  Major surgery with risk discussed  []  Drug therapy requiring intensive monitoring for toxicity  []  Hospitalization  []  Decision for DNR 60-74  40-54

## 2023-04-10 ENCOUNTER — PATIENT MESSAGE (OUTPATIENT)
Dept: PRIMARY CARE CLINIC | Facility: CLINIC | Age: 43
End: 2023-04-10
Payer: COMMERCIAL

## 2023-04-28 ENCOUNTER — LAB VISIT (OUTPATIENT)
Dept: LAB | Facility: HOSPITAL | Age: 43
End: 2023-04-28
Attending: FAMILY MEDICINE
Payer: COMMERCIAL

## 2023-04-28 ENCOUNTER — PATIENT MESSAGE (OUTPATIENT)
Dept: PRIMARY CARE CLINIC | Facility: CLINIC | Age: 43
End: 2023-04-28

## 2023-04-28 ENCOUNTER — OFFICE VISIT (OUTPATIENT)
Dept: PRIMARY CARE CLINIC | Facility: CLINIC | Age: 43
End: 2023-04-28
Payer: COMMERCIAL

## 2023-04-28 VITALS
WEIGHT: 222.69 LBS | HEIGHT: 70 IN | BODY MASS INDEX: 31.88 KG/M2 | SYSTOLIC BLOOD PRESSURE: 114 MMHG | HEART RATE: 71 BPM | DIASTOLIC BLOOD PRESSURE: 80 MMHG | OXYGEN SATURATION: 96 %

## 2023-04-28 DIAGNOSIS — F41.1 GENERALIZED ANXIETY DISORDER: ICD-10-CM

## 2023-04-28 DIAGNOSIS — E66.9 CLASS 1 OBESITY WITH SERIOUS COMORBIDITY AND BODY MASS INDEX (BMI) OF 31.0 TO 31.9 IN ADULT, UNSPECIFIED OBESITY TYPE: ICD-10-CM

## 2023-04-28 DIAGNOSIS — R53.82 CHRONIC FATIGUE: ICD-10-CM

## 2023-04-28 DIAGNOSIS — Z13.220 LIPID SCREENING: ICD-10-CM

## 2023-04-28 DIAGNOSIS — Z83.3 FAMILY HISTORY OF DIABETES MELLITUS (DM): ICD-10-CM

## 2023-04-28 DIAGNOSIS — R73.03 PRE-DIABETES: ICD-10-CM

## 2023-04-28 DIAGNOSIS — R53.82 CHRONIC FATIGUE: Primary | ICD-10-CM

## 2023-04-28 DIAGNOSIS — E66.9 CLASS 1 OBESITY WITH SERIOUS COMORBIDITY AND BODY MASS INDEX (BMI) OF 31.0 TO 31.9 IN ADULT, UNSPECIFIED OBESITY TYPE: Primary | ICD-10-CM

## 2023-04-28 PROBLEM — E66.811 CLASS 1 OBESITY WITH ALVEOLAR HYPOVENTILATION, SERIOUS COMORBIDITY, AND BODY MASS INDEX (BMI) OF 31.0 TO 31.9 IN ADULT: Status: ACTIVE | Noted: 2023-04-28

## 2023-04-28 PROBLEM — E66.2 CLASS 1 OBESITY WITH ALVEOLAR HYPOVENTILATION, SERIOUS COMORBIDITY, AND BODY MASS INDEX (BMI) OF 31.0 TO 31.9 IN ADULT: Status: ACTIVE | Noted: 2023-04-28

## 2023-04-28 LAB
ALBUMIN SERPL BCP-MCNC: 4.1 G/DL (ref 3.5–5.2)
ALP SERPL-CCNC: 59 U/L (ref 55–135)
ALT SERPL W/O P-5'-P-CCNC: 20 U/L (ref 10–44)
ANION GAP SERPL CALC-SCNC: 10 MMOL/L (ref 8–16)
AST SERPL-CCNC: 23 U/L (ref 10–40)
BASOPHILS # BLD AUTO: 0.03 K/UL (ref 0–0.2)
BASOPHILS NFR BLD: 0.7 % (ref 0–1.9)
BILIRUB SERPL-MCNC: 0.5 MG/DL (ref 0.1–1)
BUN SERPL-MCNC: 15 MG/DL (ref 6–20)
CALCIUM SERPL-MCNC: 9.3 MG/DL (ref 8.7–10.5)
CHLORIDE SERPL-SCNC: 103 MMOL/L (ref 95–110)
CHOLEST SERPL-MCNC: 223 MG/DL (ref 120–199)
CHOLEST/HDLC SERPL: 3.7 {RATIO} (ref 2–5)
CO2 SERPL-SCNC: 26 MMOL/L (ref 23–29)
CREAT SERPL-MCNC: 1.1 MG/DL (ref 0.5–1.4)
DIFFERENTIAL METHOD: NORMAL
EOSINOPHIL # BLD AUTO: 0.1 K/UL (ref 0–0.5)
EOSINOPHIL NFR BLD: 1.2 % (ref 0–8)
ERYTHROCYTE [DISTWIDTH] IN BLOOD BY AUTOMATED COUNT: 13.5 % (ref 11.5–14.5)
EST. GFR  (NO RACE VARIABLE): >60 ML/MIN/1.73 M^2
ESTIMATED AVG GLUCOSE: 123 MG/DL (ref 68–131)
GLUCOSE SERPL-MCNC: 83 MG/DL (ref 70–110)
HBA1C MFR BLD: 5.9 % (ref 4–5.6)
HCT VFR BLD AUTO: 44.6 % (ref 40–54)
HDLC SERPL-MCNC: 60 MG/DL (ref 40–75)
HDLC SERPL: 26.9 % (ref 20–50)
HGB BLD-MCNC: 14.5 G/DL (ref 14–18)
IMM GRANULOCYTES # BLD AUTO: 0.01 K/UL (ref 0–0.04)
IMM GRANULOCYTES NFR BLD AUTO: 0.2 % (ref 0–0.5)
INSULIN COLLECTION INTERVAL: NORMAL
INSULIN SERPL-ACNC: 8.1 UU/ML
LDLC SERPL CALC-MCNC: 150 MG/DL (ref 63–159)
LYMPHOCYTES # BLD AUTO: 1.6 K/UL (ref 1–4.8)
LYMPHOCYTES NFR BLD: 37.1 % (ref 18–48)
MCH RBC QN AUTO: 28.7 PG (ref 27–31)
MCHC RBC AUTO-ENTMCNC: 32.5 G/DL (ref 32–36)
MCV RBC AUTO: 88 FL (ref 82–98)
MONOCYTES # BLD AUTO: 0.3 K/UL (ref 0.3–1)
MONOCYTES NFR BLD: 6.7 % (ref 4–15)
NEUTROPHILS # BLD AUTO: 2.3 K/UL (ref 1.8–7.7)
NEUTROPHILS NFR BLD: 54.1 % (ref 38–73)
NONHDLC SERPL-MCNC: 163 MG/DL
NRBC BLD-RTO: 0 /100 WBC
PLATELET # BLD AUTO: 282 K/UL (ref 150–450)
PMV BLD AUTO: 9.5 FL (ref 9.2–12.9)
POTASSIUM SERPL-SCNC: 4.4 MMOL/L (ref 3.5–5.1)
PROT SERPL-MCNC: 7.4 G/DL (ref 6–8.4)
RBC # BLD AUTO: 5.06 M/UL (ref 4.6–6.2)
SODIUM SERPL-SCNC: 139 MMOL/L (ref 136–145)
TRIGL SERPL-MCNC: 65 MG/DL (ref 30–150)
TSH SERPL DL<=0.005 MIU/L-ACNC: 1.9 UIU/ML (ref 0.4–4)
WBC # BLD AUTO: 4.2 K/UL (ref 3.9–12.7)

## 2023-04-28 PROCEDURE — 3074F PR MOST RECENT SYSTOLIC BLOOD PRESSURE < 130 MM HG: ICD-10-PCS | Mod: CPTII,S$GLB,, | Performed by: FAMILY MEDICINE

## 2023-04-28 PROCEDURE — 1160F RVW MEDS BY RX/DR IN RCRD: CPT | Mod: CPTII,S$GLB,, | Performed by: FAMILY MEDICINE

## 2023-04-28 PROCEDURE — 83036 HEMOGLOBIN GLYCOSYLATED A1C: CPT | Performed by: FAMILY MEDICINE

## 2023-04-28 PROCEDURE — 99999 PR PBB SHADOW E&M-EST. PATIENT-LVL IV: ICD-10-PCS | Mod: PBBFAC,,, | Performed by: FAMILY MEDICINE

## 2023-04-28 PROCEDURE — 99999 PR PBB SHADOW E&M-EST. PATIENT-LVL IV: CPT | Mod: PBBFAC,,, | Performed by: FAMILY MEDICINE

## 2023-04-28 PROCEDURE — 3008F BODY MASS INDEX DOCD: CPT | Mod: CPTII,S$GLB,, | Performed by: FAMILY MEDICINE

## 2023-04-28 PROCEDURE — 3008F PR BODY MASS INDEX (BMI) DOCUMENTED: ICD-10-PCS | Mod: CPTII,S$GLB,, | Performed by: FAMILY MEDICINE

## 2023-04-28 PROCEDURE — 99214 OFFICE O/P EST MOD 30 MIN: CPT | Mod: S$GLB,,, | Performed by: FAMILY MEDICINE

## 2023-04-28 PROCEDURE — 99214 PR OFFICE/OUTPT VISIT, EST, LEVL IV, 30-39 MIN: ICD-10-PCS | Mod: S$GLB,,, | Performed by: FAMILY MEDICINE

## 2023-04-28 PROCEDURE — 84443 ASSAY THYROID STIM HORMONE: CPT | Performed by: FAMILY MEDICINE

## 2023-04-28 PROCEDURE — 3079F DIAST BP 80-89 MM HG: CPT | Mod: CPTII,S$GLB,, | Performed by: FAMILY MEDICINE

## 2023-04-28 PROCEDURE — 80061 LIPID PANEL: CPT | Performed by: FAMILY MEDICINE

## 2023-04-28 PROCEDURE — 3074F SYST BP LT 130 MM HG: CPT | Mod: CPTII,S$GLB,, | Performed by: FAMILY MEDICINE

## 2023-04-28 PROCEDURE — 80053 COMPREHEN METABOLIC PANEL: CPT | Performed by: FAMILY MEDICINE

## 2023-04-28 PROCEDURE — 83525 ASSAY OF INSULIN: CPT | Performed by: FAMILY MEDICINE

## 2023-04-28 PROCEDURE — 1159F PR MEDICATION LIST DOCUMENTED IN MEDICAL RECORD: ICD-10-PCS | Mod: CPTII,S$GLB,, | Performed by: FAMILY MEDICINE

## 2023-04-28 PROCEDURE — 1160F PR REVIEW ALL MEDS BY PRESCRIBER/CLIN PHARMACIST DOCUMENTED: ICD-10-PCS | Mod: CPTII,S$GLB,, | Performed by: FAMILY MEDICINE

## 2023-04-28 PROCEDURE — 85025 COMPLETE CBC W/AUTO DIFF WBC: CPT | Performed by: FAMILY MEDICINE

## 2023-04-28 PROCEDURE — 1159F MED LIST DOCD IN RCRD: CPT | Mod: CPTII,S$GLB,, | Performed by: FAMILY MEDICINE

## 2023-04-28 PROCEDURE — 3079F PR MOST RECENT DIASTOLIC BLOOD PRESSURE 80-89 MM HG: ICD-10-PCS | Mod: CPTII,S$GLB,, | Performed by: FAMILY MEDICINE

## 2023-04-28 RX ORDER — SEMAGLUTIDE 0.25 MG/.5ML
0.25 INJECTION, SOLUTION SUBCUTANEOUS
Qty: 2 ML | Refills: 0 | Status: SHIPPED | OUTPATIENT
Start: 2023-04-28 | End: 2023-05-02

## 2023-04-28 NOTE — PATIENT INSTRUCTIONS
"WEGOVY    WELLBUTRIN            PRODUCE  [] All fresh fruit   [] All fresh vegetables   [] All fresh herbs  [] All herb purees + pastes  [] Pre-spiralized vegetable noodles   [] Steam-In-The-Bag begetables  [] Riced cauliflower  [] Jicama sticks  [] Love Beets  all varieties  [] Wholly Guacamole  all varieties  [] Hummus  all varieties, chickpea + vegetable  [] Tofu Shirataki noodles    [] Tofu  all varieties  [] Tempeh  all varieties    PROTEIN  CHICKEN   [] Boneless, skinless breasts  [] Boneless, skinless thighs  [] Ground chicken breast, at least 93% lean  [] Chicken breast cutlet  [] Aidell's  Chicken Sausage + Chicken Meatballs    TURKEY   [] Turkey breast tenderloin   [] Ground turkey breast, at least 93% lean  [] Neotsu Naturals  Turkey Sausage    BEEF  [] Tenderloin  [] Sirloin  [] Top Loin  [] Flank Steak  [] Round Steak  [] Filet  [] Lean ground beef, at least 93% lean + grass-fed preferable    PORK  [] Tenderloin  [] Pork Chop  [] Center Cut  [] Neotsu Naturals  No-Sugar [place]    BISON  [] [place]  90 - 95% lean    SEAFOOD  [] All fresh fish + seafood; locally sourced when possible  [] Smoked salmon    HEAT + EAT ENTREES   [] John's Natural Foods  Chicken, Pork, Beef  [] Toy  "All Natural" Grilled Chicken Breast + Strips, all varieties    SAUCES SPREADS + DIPS  [] Bitchin Sauce  Original, Chipotle, Cilantro [place]  [] Kalie's Kitchen  Tzatziki Yogurt Dip, Babaganoush, Hummus  [] Wholly Guacamole  all varieties  [] Hummus  all varieties  [] Villanueva Gringo Salsa  all varieties  [] Mrs. Cruz's Salsa  all varieties  [] Stubb's All Natural BBQ Sauce  [] Primal Kitchen  Mi, Ketchup, BBQ Sauce  [] Primal Kitchen Pasta Sauce  Roasted Garlic, Tomato Basil, no-dairy Vodka Sauce  [] Sal & Barbara's  HeartSmart Pasta Sauce    DAIRY/DAIRY SUBSTITUTES/EGGS  EGGS   [] All eggs  cage-free, pasture-raise preferable  [] Dionnei  egg wraps  [] Vital Farms  Pasture-Raised Egg Bites  [] " JUST Egg [vegan]     CREAMERS   [] Califia  Better Half, original + vanilla unsweetened  [] NutPods  all varieties    MILK   [] Horizon Organic  all varieties except chocolate  [] Organic Valley  all varieties except chocolate  [] Organic Valley  ultra-filtered, reduced fat milk     PLANT_BASED MILK ALTERNATIVES  [] All unsweetened almond milks  original, vanilla + chocolate  [] Ripple  unsweetened   [] Milkadamia  original +_ vanilla, unsweetened   [] Forager  original + vanilla, unsweetened   [] Silk Organic  soy milk, unsweetened  [] Oatly  unsweetened  [] Califia  regular + protein-fortified oat milk, unsweetened     CHEESES  [] Regular or reduced fat cheeses  [] BelGioso  Fresh Mozzarella Snack Packs, Parmesan Power-full Snack   [] Goat cheese  [] Fresh mozzarella  [] String cheese  all varieties  [] Lisa Cottage Cheese  [] Karol's Cultured Cottage Cheese  [] Mehreen Life 'Just Like Mozzarella'  plant-based shreds and other varieties  [] Parmela Creamery  plant-based shredded cheese    YOGURT  [] Fage  2% low-fat, plain  [] Siggi's  plain, vanilla  [] Chobani Greek  nonfat + whole milk yogurt, plain   [] Chobani Less Sugar  all flavored varieties   [] Oikos Greek  nonfat, plain  [] Two Good  all varieties   [] Cuban Provisions  plain  [] [place] Organic  low-fat + nonfat, plain  [] [facility]  goat milk yogurt, plain  [] Kefit  unsweetened, plain  [] Forager  Greek style unsweetened, plain [dairy-free]  [] [place]  unsweetened Greek style, plain [dairy-free]  [] [facility]  almond milk yogurt, vanilla or plain, unsweetened [dairy-free]    FREEZER SECTION  FROZEN VEGGIES  [] All plain frozen veggies + greens [e.g. broccoli, brussels, carrots, okra, mushrooms, zucchini, yellow squash, butternut squash, kale, spinach, lavell greens]  [] Riced veggies [e.g. cauliflower, broccoli, butternut squash]  [] Edamame  all varieties  [] Green Giant  [] Annieggnikki Spirals  [] Marinated  Veggies [e.g. eggplant, peppers, zucchini]  [] Simply Steam [place] Sprouts  [] Birds Eye  [] Power Blend Italian Style  lentils, broccoli, kale, zucchini  []  [place] Sprouts & Carrots  [] Oven Roasters Broccoli & Cauliflower  [] [place] Blend  [] Tattooed   [] Green Bean Blend  [] Farmer's Market Ratatouille  [] Butter Balsamic Glazed Vegetables  [] Riced Cauliflower & Quinoa Mediterranean Style  [] Liliana's Good Life  Southern Style Greens [sauteed kale + onion]    FROZEN FRUITS  [] All unsweetened frozen fruits  all varieties  [] Dole Fruits & Veggie Blends  Berries 'n Kale  [] Dole Mix-ins  Triple Berry     FROZEN ENTREES  [] The Good Kitchen meals  all varieties [ e.g. Chili Lime Chicken Over Riced Cauliflower]  [] Premium Paleo  Not Pantera Momma's Meatloaf  [] Primal Kitchen  Chicken Pesto + Steak Fajitas w/ Peppers & Onions  [] Eating Well Frozen Entrees  Butter Chicken Masala, Steak Carne Asada, Creamy Pesto Chicken, Chicken + Wild Rice Stroganoff, Yellow [place] Chicken, Sun-dried Tomato Chicken, Chicken Lo Mein  [] Realgood Entree Bowls  Polish Inspired Beef Bowl over Riced Cauliflower, Chicken Burrito Bowl   [] Great Karma Coconut [place]  [] Minoo's  Tamale Gopal with Black Beans, Vegetable Lasagna  [] Kashi Mayan [place] Bake  [] Healthy Choice  Simply Steamers Chicken Fried Rice  [] Basil Pesto Chicken & Van Style Pork Power Bowls  [] Tattooed   Enchilada Bowl  [] Kiera Farms  Spicy Black Bean Burgers    FROZEN PIZZAS  [] Cauli'flour Foods  Cauliflower Pizza Crusts  [] Outer Aisle  Cauliflower Crust  [] Minoo's  Veggie Crust Cheese Pizza  [] Quest Pizza     VEGETARIAN PRODUCTS  [] Beyond Meat  ground 'meat' + grilled 'chicken' strips  [] Tofurkey  Original Italian Sausage + Original Tempeh  [] Roro  Beefless Ground + Meatless Meatballs  [] [facility]  Original Burger, Crumbles, Meatballs    ICE CREAMS + FROZEN DESSERTS  [] [facility]   regular + keto series, pops  [] Rebel  ice cream + dessert sandwiches  [] Enlightened  ice cream + bars  [] Nightfood  ice cream  [] Realgood  ice cream  [] Arctic Zero Fit  frozen pint  [] The Frozen Farmer  sorbets  [] Wholly Rollies  Protein Balls, all varieties  [] Dream Pops  Coconut Latte    FROZEN BREAKFASTS  [] Realgood  Breakfast Sandwiches on Cauliflower Cheesy Bread  [] Rebel  ice cream + dessert sandwiches  [] Enlightened  ice cream + bars  [] Nightfood  ice cream  [] Realgood  ice cream  [] Arctic Zero Fit  frozen pint  [] The Frozen Farmer  sorbets  [] Wholly Rollies  Protein Balls, all varieties  [] Dream Pops  Coconut Latte    BREADS/BUNS/WRAPS  [] Ted Bread: All Types - In Freezer Section   [] Flat Out Light Wraps - All Varieties   [] Flat Out Protein Up Carb Down Flat Bread   [] Kontos Whole Wheat Pocket Gabby   [] Vish CHELSEA 100% Whole Wheat Tortillas   [] LaTortilla Factory Tortillas - Smart & Delicious; 50 or 80-calorie   [] Nature's Own 100% Whole Wheat Bread   [] Orowheat Healthful - 100% Whole Wheat Slice Bread and Valmeyer Thins   [] Orowheat Healthful - Whole Wheat Nuts & Grain Bread; Flax & Seed Bread   [] Pepperidge Farm Natural Whole Grain 15 Bread   [] Pepperidge Farm Natural Whole Grain English Muffin - 100% Whole Wheat   [] Pepperidge Farm Very Thin 100% Whole Wheat   [] Brittney nEglish 45 Calories and Delightful   [] Alessandro' 100% Whole Wheat Thin-Sliced Bagels and English Muffins   [] Western Bagel: Perfect 10     GLUTEN FREE  [] Damon's Gluten Free Bread   [] Custer Bakehouse 7-Grain Gluten Free Bread     LEGUME PASTA   [] Explore Asian Organic Black Bean Spaghetti   [] Modern Table   [] Tolerant Foods       NUT BUTTERS & JELLIES    NUT BUTTERS   [] Better'n Chocolate: Coconut Chocolate Peanut Butter Spread   [] Better'n Peanut Butter - All Types   [] Earth Balance Coconut and Peanut Spread   [] Errol's Nut [place]   [] MaraNatha: All Natural Roasted Cashew Butter -  Otto or Creamy   [] MaraNatha: Roasted Peanut Butter   [] Nuts 'N More Peanut [place] - All Flavors   [] PB2 Powder - Original or Chocolate   [] Skippy Natural - Creamy, Super Chunk   [] Smart Balance Peanut Butter - Otto or Creamy   [] Peanut Butter & Company:   [] Smooth , Crunch Time, The Heat Is On, Old Fashioned Smooth, Mighty Nut- Powdered Peanut Butter, Squeeze Pack   [] Smucker's Natural Peanut Butter - [place] or Creamy   [] Sunbutter Nut Butter   [] Wild Friends Protein Peanut Butter/[place] o Butter - Vanilla or Chocolate     JELLIES  o Polaner's All Fruit   o Clearly Organic Best Choice: Strawberry Fruit Spread       SNACKS    BARS  [] Kashi Bars - Chewy or Crunchy; Honey [place] o Flax or Peanut Butter   [] KIND Bars - 5 Grams of Sugar or Less   [] KIND Protein Bars - Strong and KIND   [] Nature Valley Protein Bar - All Varieties   [] Nature Valley Roasted Nut Crunch - [place] Crunch; Peanut Crunch   [] [facility] Simple Nut Bar - Roasted Peanut & Honey   [] [facility] Simple Nut Bar - [place], Cashew & Sea Salt   [] [facility] Nut Cecil Bar - Salted Caramel Peanut   [] Think Thin Protein Bars   [] Quest Bars, Power Crunch Bars, Pure Protein Bars     BEEF JERKY - NITRATE FREE   [] Game On   [] Grass Run Farms   [] Krave   [] Ostrim   [] Perky Jerky   [] Primal Strips Meatless Vegan Jerky   [] [place]     CHIPS   [] Beanitos Chips   [] Fruit Crisps - e.g. Brother's-All-Natural, Bare Fruit, Yoga Chips   [] Ruth's Soy Crisps: 1.3 ounce bag   [] Quest Protein Chips   [] Wasa Whole Wheat Crisp Bread     CRACKERS  [] Anu's Gone Crackers   [] Nabisco Triscuit: Regular and Thin Crisp Crackers   [] Vans Say Cheese Crackers (G-F)     POPCORN/NUTS   [] Larry Quintanilla's Smart Pop Popcorn - Single Serving   [] 100-Calorie Pack of Nuts - All Varieties     PROTEIN POWDERS & DRINKS  []  Protein -  Whey Protein Powder   [] Garden of Life Raw Protein Powder   [] Iconic  Ready-To-Drink Protein Drink   [] Tabor One Protein Powder   [] VegaSport Protein Powder     SALSA/HUMMUS/DIPS   [] Eat Well Embrace Life: Zesty Sriracha Carrot o Hummus   [] Pre-Portioned Guacamole Packs   [] Kip's   [] Tostitos Restaurant Style Salsa       SOUPS   [] Minoo's Organic Soups - Lentil, Vegetable, Split Pea, Low-Sodium     CANNED GOODS   [] 100% Pure Pumpkin   [] BlueRunner Creole Cream-Style Red Beans or Navy Beans   [] Cajun Power Chicken Gumbo Base   [] Chicken of the Sea Klondike [place]   [] Sarah Fresh Cut Sliced Beets   [] Hormel Breast of Chicken in Water   [] LeSuer Tender Baby Whole Carrots   [] Mauriliolyana Tabasco [place] Starter   [] StarKist: Chunk Lite Tuna in Water, Gourmet Select Pouches   [] StarKist: Yellow[place] Tuna Fillets   [] Trappey's: Kidney, Butter, Ptit, Black Eye, Field, and Black Beans   [] HAMILTON Herman's Turnip Greens or Tyree Spinach     CONDIMENTS/ SAUCES/SPREADS/ SPICES  [] Yosvany Cisneros's Magic Seasonings - Regular or Salt Free   [] Radha Carbajal's Sauces - All Flavors   [] Laughing Cow Light - All Flavors   [] Dash Salt-Free Marinade - All Flavors   [] Jonny & Barbara's: Heart Smart Pasta Sauces   [] Tabasco     SALAD DRESSINGS  [] Nay's Naturals: Lite Honey Mustard   [] Andrew's Own: Lighten Up Salad Dressing - All Varieties   [] OPA Greek Yogurt Dressings - Ranch, Blue o Cheese, Caesar, Feta Dill     SWEETENERS  [] Sweet Harvel Sweetener   [] Swerve   [] Truvia     BEVERAGES  [] Coconut Water   [] Crystal Light PURE - All Flavors   [] Honest Tea: Just Green Tea, Unsweetened   [] Kombucha Tea   [] [place]   [] [place] Sisters Bloody Anu Mix   [] Metromint - Zero-Sugar; All Natural Flavored   [] Donovan - Plain or Flavored   [] Franci Lou   [] Steaz - Zero-Sugar, All-Natural, Sparkling Tea   [] Tea Bags: Any Brand - e.g. Osvaldo, Yogi, Tazzo, Celestial   [] V8 100% Vegetable Juice   [] Vitamin Water Zero   [] Water   [] Zevia - Stevia Sweetened Soft Drink      BEER/GERTRUDIS/LIQUORS  []Murray's Premier Light 64 Calories   [] Bud Select - 55 Calories   [] [facility] Sisters Bloody Anu Mix   [] Root Genuine Draft - 64 Calories   [] Red or White Wine - All Varieties     CEREALS: HOT/COLD   [] Edith's Puffin's Original Cereal  [] Agustina's Mill Oat Bran Hot Cereal - Cracked Wheat, Multi-Grain  [] Kashi GoLean Cereal  [] Kashi GoLean Hot Cereal packets - Vanilla; Honey Cinnamon  [] Carol's Special K Protein Cereal  [] Eugene's Steel Cut Faroese Oatmeal  [] Nature's Path Smart Bran  [] Jew Instant Oatmeal packet, Original  [] Jew Old Fashioned Jew Oats  [] Uncle Jones's Whole Wheat & Flaxseed Original Cereal

## 2023-04-28 NOTE — PROGRESS NOTES
"Subjective     Patient ID: Ez Muniz Sr. is a 42 y.o. male.  Pmhx, fam hx, soc hx, surg hx, allergies, med list reviewed  Referring MD: self  PCP: [place]  BMI noted   Diet: variable  Exercise/Activity:  wishes could do more  Sleep: fair  Stressors: stable  Anxiety/Depression Screen/PHQ-2: neg/stable, mood good    12 yo boy, 16 yo boy      Wt Readings from Last 3 Encounters:   04/28/23 0923 101 kg (222 lb 10.6 oz)   03/04/22 1017 96.3 kg (212 lb 4.9 oz)   01/20/22 1008 96 kg (211 lb 10.3 oz)      Chief Complaint: Establish Care (Pt. States he wants to loose weight )    Pt reports does not always feel rested. Feels like sleeps well. 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  NO joint pain. No fevers. Some snoring. Sometimes EDS in afternoons. Once gets going is good. NO sleepy driving. No apnea.     Pt has tried lifestyle interventions for a few years; exercise, workouts with nikorussel.       Food recall:   Water: appropriate  Breakfast: protein shake or oatmeal  Lunch: meal prep  Dinner: at home or on the go  Snacks: fruit, 100 nelson  Sugar sweetened beverages: zero sugar and crystal light    Taking ambien only prn/rare. Using lexapro; has been okay.     Can be a snacker but mostly when bored. NO binge eating.         HPI  Review of Systems   Constitutional:  Negative for activity change, appetite change, fatigue and unexpected weight change.   HENT:  Negative for mouth dryness.    Eyes:  Negative for visual disturbance.   Respiratory:  Negative for apnea, chest tightness and shortness of breath.    Cardiovascular:  Negative for chest pain.   Gastrointestinal:  Negative for abdominal pain.   Musculoskeletal:  Negative for arthralgias and myalgias.   Integumentary:  Negative for rash.   Allergic/Immunologic: Negative for food allergies.   Psychiatric/Behavioral:  Negative for behavioral problems and sleep disturbance. The patient is not nervous/anxious.         Objective   Waist: 40"  Physical Exam  Vitals and nursing note " reviewed.   Constitutional:       General: He is not in acute distress.  HENT:      Head: Normocephalic and atraumatic.   Eyes:      General: No scleral icterus.     Pupils: Pupils are equal, round, and reactive to light.   Neck:      Comments: No TM  Cardiovascular:      Rate and Rhythm: Normal rate and regular rhythm.      Pulses: Normal pulses.      Heart sounds: Normal heart sounds. No murmur heard.    No friction rub. No gallop.   Pulmonary:      Effort: Pulmonary effort is normal. No respiratory distress.      Breath sounds: Normal breath sounds. No wheezing, rhonchi or rales.   Abdominal:      General: Bowel sounds are normal. There is no distension.      Palpations: Abdomen is soft.      Tenderness: There is no abdominal tenderness.   Musculoskeletal:         General: No swelling.      Cervical back: Normal range of motion and neck supple. No tenderness.   Lymphadenopathy:      Cervical: No cervical adenopathy.   Skin:     General: Skin is warm.      Findings: No erythema or rash.   Neurological:      Mental Status: He is alert and oriented to person, place, and time.   Psychiatric:         Mood and Affect: Mood normal.         Behavior: Behavior normal.          Assessment and Plan       ICD-10-CM ICD-9-CM   1. Chronic fatigue  R53.82 780.79   2. Generalized anxiety disorder  F41.1 300.02   3. Family history of diabetes mellitus (DM)  Z83.3 V18.0   4. Class 1 obesity with serious comorbidity and body mass index (BMI) of 31.0 to 31.9 in adult, unspecified obesity type  E66.9 278.00    Z68.31 V85.31   5. Lipid screening  Z13.220 V77.91        Chronic fatigue  -     CBC Auto Differential; Future; Expected date: 04/28/2023  -     Comprehensive Metabolic Panel; Future; Expected date: 04/28/2023  -     TSH; Future; Expected date: 04/28/2023  Consider Wellbutrin    Generalized anxiety disorder  -     CBC Auto Differential; Future; Expected date: 04/28/2023  -     TSH; Future; Expected date: 04/28/2023    Family  history of diabetes mellitus (DM)  -     Hemoglobin A1C; Future; Expected date: 04/28/2023  -     Insulin, Random; Future; Expected date: 04/28/2023  -     semaglutide, weight loss, (WEGOVY) 0.25 mg/0.5 mL PnIj; Inject 0.25 mg into the skin every 7 days.  Dispense: 4 each; Refill: 0    Class 1 obesity with serious comorbidity and body mass index (BMI) of 31.0 to 31.9 in adult, unspecified obesity type  -     semaglutide, weight loss, (WEGOVY) 0.25 mg/0.5 mL PnIj; Inject 0.25 mg into the skin every 7 days.  Dispense: 4 each; Refill: 0  DW pt principles 5-10% weight loss    Lipid screening  -     Lipid Panel; Future; Expected date: 04/28/2023       Dietician when ready    Risks/benefits/common side effects of medication discussed with patient at length. UTD patient handout given. (mychart msg)  D/W pt at length potential pharmacologic options; she desires consideration of ozempic. Risks/benefits/common side effects of ozempic d/w pt including nausea and pain at injection site; she is aware should not get pregnant while taking and not approved while breastfeeding. NO personal/fam hx of MEN or medullary thyroid cancer. No hx of pancreatitis. Instructed pt how to use with demo pen; pt practiced in office. Pt advised if approved will get pt handout from pharmacy. Pt has no hx of thyroid nodules or biliary disease/gallstones. DW pt with substantial or rapid weight loss gallstones could develop. Also dw pt glp-1 MOA and common side effects.

## 2023-05-02 ENCOUNTER — PATIENT MESSAGE (OUTPATIENT)
Dept: PRIMARY CARE CLINIC | Facility: CLINIC | Age: 43
End: 2023-05-02
Payer: COMMERCIAL

## 2023-05-02 RX ORDER — SEMAGLUTIDE 1.34 MG/ML
INJECTION, SOLUTION SUBCUTANEOUS
Qty: 1 EACH | Refills: 0 | Status: SHIPPED | OUTPATIENT
Start: 2023-05-02 | End: 2023-05-11

## 2023-05-11 ENCOUNTER — PATIENT MESSAGE (OUTPATIENT)
Dept: PRIMARY CARE CLINIC | Facility: CLINIC | Age: 43
End: 2023-05-11
Payer: COMMERCIAL

## 2023-05-11 DIAGNOSIS — E66.9 CLASS 1 OBESITY WITH SERIOUS COMORBIDITY AND BODY MASS INDEX (BMI) OF 31.0 TO 31.9 IN ADULT, UNSPECIFIED OBESITY TYPE: Primary | ICD-10-CM

## 2023-05-11 DIAGNOSIS — R73.03 PRE-DIABETES: ICD-10-CM

## 2023-05-11 RX ORDER — SEMAGLUTIDE 0.25 MG/.5ML
0.25 INJECTION, SOLUTION SUBCUTANEOUS
Qty: 4 EACH | Refills: 0 | Status: SHIPPED | OUTPATIENT
Start: 2023-05-11 | End: 2023-05-17 | Stop reason: SDUPTHER

## 2023-05-13 ENCOUNTER — PATIENT MESSAGE (OUTPATIENT)
Dept: PRIMARY CARE CLINIC | Facility: CLINIC | Age: 43
End: 2023-05-13
Payer: COMMERCIAL

## 2023-05-17 ENCOUNTER — TELEPHONE (OUTPATIENT)
Dept: PRIMARY CARE CLINIC | Facility: CLINIC | Age: 43
End: 2023-05-17
Payer: COMMERCIAL

## 2023-05-17 ENCOUNTER — PATIENT MESSAGE (OUTPATIENT)
Dept: PRIMARY CARE CLINIC | Facility: CLINIC | Age: 43
End: 2023-05-17
Payer: COMMERCIAL

## 2023-05-17 DIAGNOSIS — E66.9 CLASS 1 OBESITY WITH SERIOUS COMORBIDITY AND BODY MASS INDEX (BMI) OF 31.0 TO 31.9 IN ADULT, UNSPECIFIED OBESITY TYPE: ICD-10-CM

## 2023-05-17 DIAGNOSIS — R73.03 PRE-DIABETES: ICD-10-CM

## 2023-05-17 RX ORDER — SEMAGLUTIDE 0.25 MG/.5ML
0.25 INJECTION, SOLUTION SUBCUTANEOUS
Qty: 4 EACH | Refills: 0 | Status: SHIPPED | OUTPATIENT
Start: 2023-05-17

## 2023-05-17 NOTE — TELEPHONE ENCOUNTER
Spoke with Mr. Muniz regarding a phone from his insurance about the Wegovy, I started to the patient per my cover my med acct his decision is pending./maria d

## 2023-05-25 ENCOUNTER — PATIENT MESSAGE (OUTPATIENT)
Dept: DERMATOLOGY | Facility: CLINIC | Age: 43
End: 2023-05-25
Payer: COMMERCIAL

## 2023-06-29 ENCOUNTER — PATIENT MESSAGE (OUTPATIENT)
Dept: DERMATOLOGY | Facility: CLINIC | Age: 43
End: 2023-06-29
Payer: COMMERCIAL

## 2023-06-29 DIAGNOSIS — R61 HYPERHIDROSIS: Primary | ICD-10-CM

## 2023-06-30 RX ORDER — GLYCOPYRROLATE 1 MG/1
TABLET ORAL
Qty: 90 TABLET | Refills: 0 | Status: SHIPPED | OUTPATIENT
Start: 2023-06-30

## 2023-07-14 ENCOUNTER — OFFICE VISIT (OUTPATIENT)
Dept: PRIMARY CARE CLINIC | Facility: CLINIC | Age: 43
End: 2023-07-14
Payer: COMMERCIAL

## 2023-07-14 VITALS
OXYGEN SATURATION: 98 % | HEIGHT: 70 IN | WEIGHT: 222.44 LBS | TEMPERATURE: 97 F | SYSTOLIC BLOOD PRESSURE: 118 MMHG | RESPIRATION RATE: 18 BRPM | HEART RATE: 58 BPM | BODY MASS INDEX: 31.85 KG/M2 | DIASTOLIC BLOOD PRESSURE: 80 MMHG

## 2023-07-14 DIAGNOSIS — E66.9 CLASS 1 OBESITY WITH SERIOUS COMORBIDITY AND BODY MASS INDEX (BMI) OF 31.0 TO 31.9 IN ADULT, UNSPECIFIED OBESITY TYPE: ICD-10-CM

## 2023-07-14 DIAGNOSIS — F41.1 GENERALIZED ANXIETY DISORDER: ICD-10-CM

## 2023-07-14 DIAGNOSIS — R73.03 PREDIABETES: ICD-10-CM

## 2023-07-14 PROCEDURE — 99999 PR PBB SHADOW E&M-EST. PATIENT-LVL V: CPT | Mod: PBBFAC,,, | Performed by: FAMILY MEDICINE

## 2023-07-14 PROCEDURE — 3074F SYST BP LT 130 MM HG: CPT | Mod: CPTII,S$GLB,, | Performed by: FAMILY MEDICINE

## 2023-07-14 PROCEDURE — 1159F PR MEDICATION LIST DOCUMENTED IN MEDICAL RECORD: ICD-10-PCS | Mod: CPTII,S$GLB,, | Performed by: FAMILY MEDICINE

## 2023-07-14 PROCEDURE — 99214 PR OFFICE/OUTPT VISIT, EST, LEVL IV, 30-39 MIN: ICD-10-PCS | Mod: S$GLB,,, | Performed by: FAMILY MEDICINE

## 2023-07-14 PROCEDURE — 3008F BODY MASS INDEX DOCD: CPT | Mod: CPTII,S$GLB,, | Performed by: FAMILY MEDICINE

## 2023-07-14 PROCEDURE — 3044F HG A1C LEVEL LT 7.0%: CPT | Mod: CPTII,S$GLB,, | Performed by: FAMILY MEDICINE

## 2023-07-14 PROCEDURE — 99999 PR PBB SHADOW E&M-EST. PATIENT-LVL V: ICD-10-PCS | Mod: PBBFAC,,, | Performed by: FAMILY MEDICINE

## 2023-07-14 PROCEDURE — 3079F DIAST BP 80-89 MM HG: CPT | Mod: CPTII,S$GLB,, | Performed by: FAMILY MEDICINE

## 2023-07-14 PROCEDURE — 1160F PR REVIEW ALL MEDS BY PRESCRIBER/CLIN PHARMACIST DOCUMENTED: ICD-10-PCS | Mod: CPTII,S$GLB,, | Performed by: FAMILY MEDICINE

## 2023-07-14 PROCEDURE — 1160F RVW MEDS BY RX/DR IN RCRD: CPT | Mod: CPTII,S$GLB,, | Performed by: FAMILY MEDICINE

## 2023-07-14 PROCEDURE — 3044F PR MOST RECENT HEMOGLOBIN A1C LEVEL <7.0%: ICD-10-PCS | Mod: CPTII,S$GLB,, | Performed by: FAMILY MEDICINE

## 2023-07-14 PROCEDURE — 1159F MED LIST DOCD IN RCRD: CPT | Mod: CPTII,S$GLB,, | Performed by: FAMILY MEDICINE

## 2023-07-14 PROCEDURE — 3079F PR MOST RECENT DIASTOLIC BLOOD PRESSURE 80-89 MM HG: ICD-10-PCS | Mod: CPTII,S$GLB,, | Performed by: FAMILY MEDICINE

## 2023-07-14 PROCEDURE — 3008F PR BODY MASS INDEX (BMI) DOCUMENTED: ICD-10-PCS | Mod: CPTII,S$GLB,, | Performed by: FAMILY MEDICINE

## 2023-07-14 PROCEDURE — 99214 OFFICE O/P EST MOD 30 MIN: CPT | Mod: S$GLB,,, | Performed by: FAMILY MEDICINE

## 2023-07-14 PROCEDURE — 3074F PR MOST RECENT SYSTOLIC BLOOD PRESSURE < 130 MM HG: ICD-10-PCS | Mod: CPTII,S$GLB,, | Performed by: FAMILY MEDICINE

## 2023-07-14 RX ORDER — METFORMIN HYDROCHLORIDE 500 MG/1
TABLET, EXTENDED RELEASE ORAL
Qty: 60 TABLET | Refills: 2 | Status: SHIPPED | OUTPATIENT
Start: 2023-07-14 | End: 2023-08-04 | Stop reason: SDUPTHER

## 2023-07-14 NOTE — PROGRESS NOTES
Subjective       Pmhx, fam hx, soc hx, surg hx, allergies, med list reviewed    Wt Readings from Last 3 Encounters:   07/14/23 0731 100.9 kg (222 lb 7.1 oz)   04/28/23 0923 101 kg (222 lb 10.6 oz)   03/04/22 1017 96.3 kg (212 lb 4.9 oz)       Patient ID: Ez Muniz Sr. is a 43 y.o. male.    Chief Complaint: Follow-up (Follow up)    Pt with hx of diabetes. Wegovy not approved. Pt would like to consider another medication if possible.     HOANG stable. Compliant with lexapro.     Saw cardiology last year prior to going to [place] for work. Neg w/up.  EKG early repol.     Pt has tried dietary interventions and is monitoring sodium.     HPI  Review of Systems   Constitutional:  Negative for activity change, appetite change, fatigue and unexpected weight change.   HENT:  Negative for mouth dryness.    Eyes:  Negative for visual disturbance.   Respiratory:  Negative for apnea, chest tightness and shortness of breath.    Cardiovascular:  Negative for chest pain.   Gastrointestinal:  Negative for abdominal pain.   Musculoskeletal:  Negative for arthralgias and myalgias.   Integumentary:  Negative for rash.   Allergic/Immunologic: Negative for food allergies.   Psychiatric/Behavioral:  Negative for behavioral problems and sleep disturbance. The patient is not nervous/anxious.         Objective     Physical Exam  Vitals and nursing note reviewed.   Constitutional:       General: He is not in acute distress.  HENT:      Head: Normocephalic and atraumatic.   Eyes:      General: No scleral icterus.     Pupils: Pupils are equal, round, and reactive to light.   Neck:      Comments: No TM  Cardiovascular:      Rate and Rhythm: Normal rate and regular rhythm.      Pulses: Normal pulses.      Heart sounds: Normal heart sounds. No murmur heard.    No friction rub. No gallop.   Pulmonary:      Effort: Pulmonary effort is normal. No respiratory distress.      Breath sounds: Normal breath sounds. No wheezing, rhonchi or rales.    Abdominal:      General: Bowel sounds are normal. There is no distension.      Palpations: Abdomen is soft.      Tenderness: There is no abdominal tenderness.   Musculoskeletal:         General: No swelling.      Cervical back: Normal range of motion and neck supple. No tenderness.   Lymphadenopathy:      Cervical: No cervical adenopathy.   Skin:     General: Skin is warm.      Findings: No erythema or rash.   Neurological:      Mental Status: He is alert and oriented to person, place, and time.   Psychiatric:         Mood and Affect: Mood normal.         Behavior: Behavior normal.          Assessment and Plan     ICD-10-CM ICD-9-CM   1. Prediabetes  R73.03 790.29   2. Generalized anxiety disorder  F41.1 300.02   3. Class 1 obesity with alveolar hypoventilation, serious comorbidity, and body mass index (BMI) of 31.0 to 31.9 in adult  E66.2 278.03    Z68.31 V85.31          Prediabetes  -     metFORMIN (GLUCOPHAGE-XR) 500 MG ER 24hr tablet; Take 1 tablet (500 mg total) by mouth once daily for 14 days, THEN 1 tablet (500 mg total) 2 (two) times daily with meals for 14 days.  Dispense: 60 tablet; Refill: 2  Dw pt at length risks/benefits/se;s  New dx; will implement interventions with med/continue lifestyle changes  DW pt currently important to prevent progression with lifestyle interventions .    Generalized anxiety disorder  Comments:  chronic/stable  Orders:  -     Ambulatory Referral/Consult to Nutrition Services - [facility]; Future; Expected date: 07/21/2023    Class 1 obesity with alveolar hypoventilation, serious comorbidity, and body mass index (BMI) of 31.0 to 31.9 in adult  -     Ambulatory Referral/Consult to Nutrition Services - [facility]; Future; Expected date: 07/21/2023  -     metFORMIN (GLUCOPHAGE-XR) 500 MG ER 24hr tablet; Take 1 tablet (500 mg total) by mouth once daily for 14 days, THEN 1 tablet (500 mg total) 2 (two) times daily with meals for 14 days.  Dispense: 60  tablet; Refill: 2    Dw pt risks/benefits/se's  Ok to continue iron and vit d  Anxiety stable  Also dw pt consideration of Contrave  (No hx of seizures, bipolar, no narcotic pain med use)  32 min with pt

## 2023-07-27 DIAGNOSIS — R73.03 PREDIABETES: ICD-10-CM

## 2023-07-27 DIAGNOSIS — E66.9 CLASS 1 OBESITY WITH SERIOUS COMORBIDITY AND BODY MASS INDEX (BMI) OF 31.0 TO 31.9 IN ADULT, UNSPECIFIED OBESITY TYPE: ICD-10-CM

## 2023-07-27 RX ORDER — METFORMIN HYDROCHLORIDE 500 MG/1
TABLET, EXTENDED RELEASE ORAL
Refills: 0 | OUTPATIENT
Start: 2023-07-27

## 2023-07-27 NOTE — TELEPHONE ENCOUNTER
Refill Decision Note   Ez Muniz  is requesting a refill authorization.  Brief Assessment and Rationale for Refill:  Quick Discontinue     Medication Therapy Plan: Pharmacy is requesting new scripts for the following medications without required information, (sig/ frequency/qty/etc)       Medication Reconciliation Completed: No   Comments: Pharmacies have been requesting medications for patients without required information, (sig, frequency, qty, etc.). In addition, requests are sent for medication(s) pt. are currently not taking, and medications patients have never taken.    We have spoken to the pharmacies about these request types and advised their teams previously that we are unable to assess these New Script requests and require all details for these requests. This is a known issue and has been reported.     No Care Gaps recommended.     Note composed:5:46 PM 07/27/2023

## 2023-07-27 NOTE — TELEPHONE ENCOUNTER
Care Due:                  Date            Visit Type   Department     Provider  --------------------------------------------------------------------------------                                ESTABLISHED                              PATIENT -    [facility]  Last Visit: 10-      [facility]       Lin Villagomez  Next Visit: None Scheduled  None         None Found                                                            Last  Test          Frequency    Reason                     Performed    Due Date  --------------------------------------------------------------------------------    Office Visit  12 months..  EScitalopram, metFORMIN,   10-   10-                             semaglutide,.............    [employer] Embedded Care Due Messages. Reference number: [id number].   7/27/2023 3:48:53 PM CDT

## 2023-08-04 ENCOUNTER — PATIENT MESSAGE (OUTPATIENT)
Dept: PRIMARY CARE CLINIC | Facility: CLINIC | Age: 43
End: 2023-08-04
Payer: COMMERCIAL

## 2023-08-04 DIAGNOSIS — R73.03 PREDIABETES: ICD-10-CM

## 2023-08-04 DIAGNOSIS — E66.9 CLASS 1 OBESITY WITH SERIOUS COMORBIDITY AND BODY MASS INDEX (BMI) OF 31.0 TO 31.9 IN ADULT, UNSPECIFIED OBESITY TYPE: ICD-10-CM

## 2023-08-04 RX ORDER — METFORMIN HYDROCHLORIDE 500 MG/1
TABLET, EXTENDED RELEASE ORAL
Qty: 180 TABLET | Refills: 2 | Status: SHIPPED | OUTPATIENT
Start: 2023-08-04 | End: 2023-09-01

## 2023-08-08 ENCOUNTER — TELEPHONE (OUTPATIENT)
Dept: PRIMARY CARE CLINIC | Facility: CLINIC | Age: 43
End: 2023-08-08
Payer: COMMERCIAL

## 2023-08-08 NOTE — TELEPHONE ENCOUNTER
Spoke with [facility] and verified the metformin prescription that Dr. Mercer sent in for patient.

## 2023-08-29 ENCOUNTER — OFFICE VISIT (OUTPATIENT)
Dept: DERMATOLOGY | Facility: CLINIC | Age: 43
End: 2023-08-29
Payer: COMMERCIAL

## 2023-08-29 DIAGNOSIS — R61 HYPERHIDROSIS: Primary | ICD-10-CM

## 2023-08-29 PROCEDURE — 1159F PR MEDICATION LIST DOCUMENTED IN MEDICAL RECORD: ICD-10-PCS | Mod: CPTII,S$GLB,, | Performed by: DERMATOLOGY

## 2023-08-29 PROCEDURE — 99999 PR PBB SHADOW E&M-EST. PATIENT-LVL III: CPT | Mod: PBBFAC,,, | Performed by: DERMATOLOGY

## 2023-08-29 PROCEDURE — 99999 PR PBB SHADOW E&M-EST. PATIENT-LVL III: ICD-10-PCS | Mod: PBBFAC,,, | Performed by: DERMATOLOGY

## 2023-08-29 PROCEDURE — 1159F MED LIST DOCD IN RCRD: CPT | Mod: CPTII,S$GLB,, | Performed by: DERMATOLOGY

## 2023-08-29 PROCEDURE — 1160F PR REVIEW ALL MEDS BY PRESCRIBER/CLIN PHARMACIST DOCUMENTED: ICD-10-PCS | Mod: CPTII,S$GLB,, | Performed by: DERMATOLOGY

## 2023-08-29 PROCEDURE — 99214 OFFICE O/P EST MOD 30 MIN: CPT | Mod: S$GLB,,, | Performed by: DERMATOLOGY

## 2023-08-29 PROCEDURE — 99214 PR OFFICE/OUTPT VISIT, EST, LEVL IV, 30-39 MIN: ICD-10-PCS | Mod: S$GLB,,, | Performed by: DERMATOLOGY

## 2023-08-29 PROCEDURE — 3044F HG A1C LEVEL LT 7.0%: CPT | Mod: CPTII,S$GLB,, | Performed by: DERMATOLOGY

## 2023-08-29 PROCEDURE — 1160F RVW MEDS BY RX/DR IN RCRD: CPT | Mod: CPTII,S$GLB,, | Performed by: DERMATOLOGY

## 2023-08-29 PROCEDURE — 3044F PR MOST RECENT HEMOGLOBIN A1C LEVEL <7.0%: ICD-10-PCS | Mod: CPTII,S$GLB,, | Performed by: DERMATOLOGY

## 2023-08-29 NOTE — PROGRESS NOTES
Subjective:      Patient ID:  Ez Muniz Sr. is a 43 y.o. male who presents for   Chief Complaint   Patient presents with    Excessive Sweating     Treatment for hyperhidrosis     Last seen 3/23/23 for initial eval of hyperhidrosis, rx'd Drysol, rec's OTC Carpe. Started robinul 6/29/23- works outside in the heat and is unable to take.  Is interested in home iontophoresis- has [facility] packet today to fill out.  Reports drysol helped some but stopped working and also caused dry cracked rash on hands, so stopped using it and tried the OTC one instead- minimal improvement.           HPI from initial eval:  History of Present Illness: The patient presents with chief complaint of excessive sweating.  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       Objective:   Physical Exam   Constitutional: He appears well-developed and well-nourished. No distress.   Neurological: He is alert and oriented to person, place, and time. He is not disoriented.   Psychiatric: He has a normal mood and affect.   Skin:   Areas Examined (abnormalities noted in diagram):   RUE Inspected  LUE Inspection Performed  RLE Inspected  LLE Inspection Performed            Diagram Legend     Erythematous scaling macule/papule c/w actinic keratosis       Vascular papule c/w angioma      Pigmented verrucoid papule/plaque c/w seborrheic keratosis      Yellow umbilicated papule c/w sebaceous hyperplasia      Irregularly shaped tan macule c/w lentigo     1-2 mm smooth white papules consistent with Milia      Movable subcutaneous cyst with punctum c/w epidermal inclusion cyst      Subcutaneous movable cyst c/w pilar cyst      Firm pink to brown papule c/w dermatofibroma      Pedunculated fleshy papule(s) c/w skin tag(s)      Evenly pigmented macule c/w  junctional nevus     Mildly variegated pigmented, slightly irregular-bordered macule c/w mildly atypical nevus      Flesh colored to evenly pigmented papule c/w intradermal nevus       Pink pearly papule/plaque c/w basal cell carcinoma      Erythematous hyperkeratotic cursted plaque c/w SCC      Surgical scar with no sign of skin cancer recurrence      Open and closed comedones      Inflammatory papules and pustules      Verrucoid papule consistent consistent with wart     Erythematous eczematous patches and plaques     Dystrophic onycholytic nail with subungual debris c/w onychomycosis     Umbilicated papule    Erythematous-base heme-crusted tan verrucoid plaque consistent with inflamed seborrheic keratosis     Erythematous Silvery Scaling Plaque c/w Psoriasis     See annotation      Assessment / Plan:        Hyperhidrosis    - severe, affecting quality of life and ability to perform his job  - refractory to OTC topical antiperspirants, Drysol, oral glycopyrrolate  - desires home iontophoresis unit  - he signed a record release form  - completed paperwork and faxed to [facility] with clinic notes, and gave info on additional units below      Iontophoresis   - warm water is less uncomfortable  - start using nightly for 15 minutes, work your way up to the highest energy level  - use for at least 2 weeks to see response  - may be able to decrease use to 1-2 times a week eventually  - could consider adding a crushed tablet of glycopyrrolate to the water    [facility] (~$900-$1000)  [email or web address]  Has a form you can fill out to see if insurance will cover it    [facility] (~$350-$500)  [email or web address]    [employer] (~$300)  [email or web address]              Follow up if symptoms worsen or fail to improve.          LOS NUMBER AND COMPLEXITY OF PROBLEMS    COMPLEXITY OF DATA RISK TOTAL TIME (m)   [id number]  [id number] [] 1 self-limited or minor problem [x] Minimal  to none [] No treatment recommended or patient to monitor. Reassurance.  15-29  10-19   79210  16731 Low  [] 2 or more self limited or minor problems  [] 1 stable chronic illness  [] 1 acute, uncomplicated illness or injury Limited (2)  [] Prior external notes from each unique source  [] Review result of each unique test  [] Order each unique test  OR [] Independent historian Low  []  OTC medications   []  Discussed/Decision for minor skin surgery (no risk factors) 30-44  20-29   [id number]  71332 Moderate  [x]  1 or more chronic unstable illness (not at goal or progression or exacerbation) or SE of treatment  []  2 or more stable chronic illnesses  []  1 acute illness with systemic symptoms  []  1 acute complicated injury  []  1 undiagnosed new problem with uncertain prognosis Moderate (1/3 below)  []  3 or more data items        *Now includes independent historian  []  Independent interpretation of a test  []  Discuss management/test with another provider Moderate  [x]  Prescription drug mgmt  []  Discussed/Decision for Minor surgery with risk factors  []  Mgmt limited by social determinates 45-59  30-39   95857  91027 High  []  1 or more chronic illness with severe exacerbation, progression or SE of treatment  []  1 acute or chronic illness/injury that poses a threat to life or bodily function Extensive (2/3 below)  []  3 or more data items        *Now includes independent historian.  []  Independent interpretation of a test  []  Discuss management/test with another provider High  []  Major surgery with risk discussed  []  Drug therapy requiring intensive monitoring for toxicity  []  Hospitalization  []  Decision for DNR 60-74  40-54

## 2023-09-06 ENCOUNTER — NUTRITION (OUTPATIENT)
Dept: PRIMARY CARE CLINIC | Facility: CLINIC | Age: 43
End: 2023-09-06
Payer: COMMERCIAL

## 2023-09-06 DIAGNOSIS — F41.1 GENERALIZED ANXIETY DISORDER: ICD-10-CM

## 2023-09-06 DIAGNOSIS — E66.9 CLASS 1 OBESITY WITH SERIOUS COMORBIDITY AND BODY MASS INDEX (BMI) OF 31.0 TO 31.9 IN ADULT, UNSPECIFIED OBESITY TYPE: ICD-10-CM

## 2023-09-06 PROCEDURE — 97802 PR MED NUTR THER, 1ST, INDIV, EA 15 MIN: ICD-10-PCS | Mod: S$GLB,,, | Performed by: DIETITIAN, REGISTERED

## 2023-09-06 PROCEDURE — 99999 PR PBB SHADOW E&M-EST. PATIENT-LVL II: CPT | Mod: PBBFAC,,, | Performed by: DIETITIAN, REGISTERED

## 2023-09-06 PROCEDURE — 97802 MEDICAL NUTRITION INDIV IN: CPT | Mod: S$GLB,,, | Performed by: DIETITIAN, REGISTERED

## 2023-09-06 PROCEDURE — 99999 PR PBB SHADOW E&M-EST. PATIENT-LVL II: ICD-10-PCS | Mod: PBBFAC,,, | Performed by: DIETITIAN, REGISTERED

## 2023-09-06 NOTE — PROGRESS NOTES
"Nutrition Assessment    Visit Type: Insurance initial  Session Time:  2 Hours  Reason for MNT visit: Pt in for education and nutrition counseling regarding Prediabetes and Obesity.       Age: 43 y.o.  Wt:   Wt Readings from Last 1 Encounters:   07/14/23 100.9 kg (222 lb 7.1 oz)     Ht:   Ht Readings from Last 1 Encounters:   07/14/23 5' 10" (1.778 m)     BMI:   BMI Readings from Last 1 Encounters:   07/14/23 31.92 kg/m²       Client states:  He has been trying to lose weight and eat healthier. In the past he successfully lost 20 pounds with Ideal Protein diet. He did regain all the weight back. He works shift work. He makes good choices for the most part. Loves sweets and chips. Discussed better-for-you options for chips such as Pop Corners and Pop Chips. Discussed eating enough fiber + protein earlier in the day to help with hunger in evenings/sweet cravings. Discussed changing pre-workout from Lit AF to a 3rd party tested lower caffeine option. Discussed increased need for protein during weight loss.     Medical History  Problem List             Resolved    Generalized anxiety disorder         Obesity (BMI 30.0-34.9)         Abnormal EKG         Chronic fatigue         Class 1 obesity with alveolar hypoventilation, serious comorbidity, and body mass index (BMI) of 31.0 to 31.9 in adult         Prediabetes            Past Medical History:   Diagnosis Date    Anxiety     Hypertension     Insomnia        Past Surgical History:   Procedure Laterality Date    WISDOM TOOTH EXTRACTION            Medications    Prior to Admission medications    Medication Sig Start Date End Date Taking? Authorizing Provider   aluminum chloride (DRYSOL) 20 % external solution Apply topically every evening. Increase as needed to BID after 1 week, and if needed, TID after 1 more week 3/23/23   Karla Yuan MD   EScitalopram oxalate (LEXAPRO) 20 MG tablet Take 1 tablet (20 mg total) by mouth once daily. 2/9/23   Lin Villagomez, DO "   glycopyrrolate (ROBINUL) 1 mg Tab Start at 1 mg daily for the first week, then twice daily for one week, then three times a day. 6/30/23   Karla Yuan MD   metFORMIN (GLUCOPHAGE-XR) 500 MG ER 24hr tablet Take 1 tablet (500 mg total) by mouth once daily for 14 days, THEN 1 tablet (500 mg total) 2 (two) times daily with meals for 14 days. 8/4/23 9/1/23  Jennifer Mercer MD   semaglutide, weight loss, (WEGOVY) 0.25 mg/0.5 mL PnIj Inject 0.25 mg into the skin every 7 days. 5/17/23   Jennifer Mercer MD   zolpidem (AMBIEN) 10 mg Tab TK 1 T PO QD HS 10/5/20   Provider, Historical        Vitamins, Minerals, and/or Supplements:  MVI daily     Food Allergies or Intolerances:  None     Social History    Marital status:      Social History     Tobacco Use    Smoking status: Never    Smokeless tobacco: Never   Substance Use Topics    Alcohol use: Yes     Current Alcohol use: Once a week, whiskey and Coke Zero    Lab Reports   Reviewed and noted    Lab Results   Component Value Date    [id number] 28 (L) 10/14/2022    TSH 1.901 04/28/2023    AST 23 04/28/2023    ALT 20 04/28/2023    HDL 60 04/28/2023    LDLCALC 150.0 04/28/2023    TRIG 65 04/28/2023    HGBA1C 5.9 (H) 04/28/2023    HGBA1C 5.5 01/20/2022         BP Readings from Last 1 Encounters:   07/14/23 118/80        24-hour Recall    4 am wake up  Crystal Light or water  5 am workout  730/8 am breakfast oatmeal + protein shake // 2-3 hard boiled eggs (eats 1 yolk) with packet of grits  11/12 pm lunch [employer] meal prep meal, >40 grams  protein, >10 grams  fat, <40 grams  carbohydrates   Pm snack usually fruit  5 pm leave work  Pm snack chips // almond // trail mix  7/8 pm dinner pastalya  Pm snack cookies // rice krispies  9 pm bed     Beverages  Crystal Light   ~64 ounces water  1 cup coffee OR 1/2 scoop LIT AF pre-workout mix     LIFESTYLE FACTORS    Dining out: 3-4 x per week, mostly restaurants, mostly fast food, mostly take out  Greek //  Chinese // [employer]    Meal preparation/shopping: wife and self    Sleep: poor  5-8 hours, struggles with staying asleep, also shift work    Stress Level: moderate  Management: nothing noted    Support System:  spouse    Physical Activity: Moderately active (moderate intensity, 3-5 days a week)      Diagnosis    Inadequate protein intake related to desire for weight loss and strength training as evidenced by 24-hour food recall and current habits.      Intervention    Estimated Energy Requirements:   Calories: 4708-1709  Protein: 160-175 grams   Carbohydrates: 210-230 grams   Total Fat: 70-77 grams   Baseline for fluids: 100 ounces + sweat loss    Recommendations & Goals:  Patient goals and recommendations are tailored to the specific patient's needs, readiness to change, lifestyle, culture, skills, resources, & abilities. Strategies to help achieve these nutrition-related goals were discussed which can include but are not limited to SMART goal setting & mindful eating.     Aim for a minimum of 7 hours sleep   Exercise for a minimum of 150 minutes of moderate intensity activity per week  Eat breakfast within 1-2 hours of waking up  Try not to skip any meals or snacks, not going more than 3-4 hours without eating   At each meal and snack, include fiber + lean protein + healthy fat     Written Materials Provided  These resources are intended to assist the patient in making it easier to choose recommended options when eating out & to identify better-for-you brands at the grocery store:    Meal Planning Guide with recommendations discussed along with portion sizes and a customized meal plan   Fueling Well On-the-Go Food Guide  Eat Fit Shopping Guide  RD contact information    Goals:  1.  Have fiber + fat + protein at each meal  2.  Add 1-2 snacks between lunch/dinner  3.  Increase hydration to 100 ounces per day with 20 additional ounces on workout days      Monitoring/Evaluation    Monitor the  following:  Weight  Sleep  Stress Management  Movement  Nutrient intake in reference to meal plan    Communicated with healthcare provider and documented plan for referral to appropriate agency/healthcare provider as needed    Supervising Physician: Papa Davila MD    Patient motivation, anticipated barriers, expected compliance: Patient is motivated and has verbalized understanding and intent to comply.     Comprehension: good     Follow-up: PRN

## 2023-09-06 NOTE — PATIENT INSTRUCTIONS
NUTRITION PROTOCOL    Energy Needs:  Calories: 2949-0721  Protein: 158-173 grams // 23-25 ounces  Carbohydrates: 210-230 grams // 14-15 servings  Fat: 70-77 grams // 7 servings  Focus on Heart Healthy Fats  Hydration: At least ½ your body weight in ounces + an additional 20 oz for each hour of activity or sweat loss  Added Sugar: Limit to under 20g per day      Action Items:  Have fiber + fat + protein at each meal  Add 1-2 snack between lunch and dinner  Have protein + fiber and/or fat  Increase hydration to 100 ounces per day with 20 additional ounces on workout days  Supplements  MVI Recommended Brands  Garden of Life Vitamin Code   Now Multivitamin   NatureMade Multi  Raymond Multi    Vitamin D  Now Vitamin D   Nature Made Vitamin D   Raymond Vitamin D      Additional Recommendations  Take multivitamin with meal for better absorption  Consider adding a separate Vitamin D supplement  Double check with PCP, but generally recommend 5,000 IU (international units) per day with a meal  Measure out portions the first few times, then eyeball it.  Add in fiber on the side of the meal prep meals  ½ salad kit  1-2 cups non-starchy vegetables  1 serving fruit  Rice suggestions  Brown rice  Parish white rice (higher protein white rice)  50/50 mixture of cauliflower rice and white rice  Pasta suggestions  Banza chickpea pasta  Whole wheat pasta  Focus on getting adequate protein + filling ½ plate with non-starchy vegetables first  If still not noticing weight loss recommend tracking calories with a focus on enough protein first then target calories  Eat your breakfast or have a protein shake/bar within 1 hour of workouts  Try ½ a banana, 1 applesauce pouch, soft n' chewy granola bar, or small rice krispy as a pre-workout snack instead of pre-workout powder  Look for a pre-workout powder that is NSF certified for sport or Informed Sport certified. These symbols will be directly on packaging of products.                                             Sample Fueling Schedule  Wake Up    Pre-workout hydration + snack  Protein: None or <10g  Carbohydrates: ~15 grams or 1 servings    Exercise    Breakfast  Protein: at least 30g or 4-6 ounces  Carbohydrates: 3-4 servings or 45-60g  Color: At least 1-3 different colors    Lunch  Protein: at least 40g or 6-8 ounces  Carbohydrates: 3-4 servings or 30-60g  Color: At least 1-3 different colors    Snack  Protein: at least 15g or 2-4 ounces  Carbohydrates: 1-2 servings or 15-30g OR  Fat: 0.5 - 1 serving of fat approximately 7-14g  Color: Aim to include at least 1 color or 2-3 colors if not eating a carbohydrate source    Snack  Protein: at least 15g or 2-4 ounces  Carbohydrates: 1-2 servings or 15-30g OR  Fat: 0.5 - 1 serving of fat approximately 7-14g  Color: Aim to include at least 1 color or 2-3 colors if not eating a carbohydrate source    Dinner  Protein: at least 40g or 6-8 ounces  Carbohydrates: 2-3 servings or 30-45g  Color: At least 1-3 different colors    Optional Snack/Sweet Treat 1-3 times per week  Protein: 10-20 grams // 1-3 ounces  Carbohydrates: <45 grams // 1-3 servings    Bed  Breakfast Options    2 slices Breakfast Toast Ideas + Protein Shake  Breakfast sandwich with whole grain English muffin + 1 egg + 3 slices Amity wolf or ~ 2 ounces deli meat + slice of cheese + 1-2 servings of fruit on the side  Jamil's Killer Bread bagel + 1 avocado + 2 hard boiled eggs or 3 links Ellie or Antonio (found at [employer]) Chicken Breakfast Sausage or try this homemade version Chicken Breakfast Sausage    Jamil's Killer Bread Bagel with 4 tbsp whipped cream cheese, and 1 serving Good Culture (higher protein) cottage cheese or ¾ cup Lisa low-fat cottage cheese  3 eggs + 2-3 chicken/turkey sausage links (or 3 slices center cut wolf) + 2 slices whole grain toast + 1 tbsp no sugar added jelly/jam + 1-2 tsp butter  2 [place] Cake Waffles + 2 tbsp maple syrup or honey + 5.3 ounce cup Greek yogurt flavor of  choice + ½ cup frozen or fresh berries  High Protein Overnight Oats    Protein Hot Oatmeal Breakfast  1 hard boiled egg  2 babybel cheese or other cheesestick  1 mandarin  1 teaspoon craisins  ½ cup oatmeal  ½ scoop protein powder or ½ bottle Fairlife/premier protein shake (use this to cook the oatmeal in)  Yogurt Parfait  1 cup low sugar Greek yogurt, any flavor  1 cup mixed berries  ½ cup low sugar cereal (see brand recommendations at end of document)  Meal Prep Breakfast Options:  [place] Chicken Egg Cups + Fruit  1-2 servings Ham and Broccoli Breakfast Casserole + serving fruit or add in additional non-starchy veggies such as bell peppers and spinach to casserole    Bagel Ham and Cheese Quiche  Blueberry Protein Pancakes  English Muffin Breakfast Bake      Meal Prep Breakfast Burritos + 1 serving fruit  Breakfast Snack Box  Option 1:  2 scrambled eggs  2 [place] cake buttermilk & vanilla waffles  ½ cup low sugar vanilla Greek yogurt  ½ cup fresh blueberries  2 tbsp sugar-free syrup  Option 2:   2 scrambled eggs  2 Veggies Made Great cinnamon roll muffins  3 [place] Chicken breakfast sausage links  1 cup fresh strawberries  Raspberry Fiber Smoothie (makes 1 smoothie)  1 cup frozen raspberries  1 cup unsweetened almond milk  2/3 cup nonfat vanilla Greek yogurt (or 5.3 oz cup) or 1 scoop protein powder  ½ cup frozen cauliflower (florets or riced)  1 tbsp almond butter  1 tbsp old fashioned oats  1 tbsp edd seeds  ½ tbsp honey  Strawberry Banana Protein Smoothie  1 cup strawberries, fresh or frozen  ½ medium banana  ½ cup diced leigh ann, fresh or frozen  ½ cup nonfat plain Greek yogurt  1 tbsp no sugar added nut butter  1 tbsp ground flaxseed (flaxmeal)  ¼ tsp vanilla extract  Optional: 4 ice cubs or ½ cup water  PB&J Smoothie Bowl  ¼ cup milk of your choice  2/3 cup frozen blueberries  2/3 cup sliced strawberries, frozen  1 scoop vanilla protein powder  1 tbsp peanut butter  Optional toppings: 1 tbsp melted peanut  butter, 1-2 tbsp granola, edd seeds, blue berries  Spinach Avocado Smoothie  1 cup nonfat plain Greek yogurt  1 cup fresh spinach  1 frozen banana  ¼ avocado  2 tbsp water    Lunch/Dinner Options  Any salad kit + packet or small can of tuna/salmon or 5-8 ounces of rotisserie chicken (skin pulled off)   6-8 ounces of lean protein of choice (fish, chicken with no skin, lean ground meat, etc.)  1-2 cups non-starchy vegetables  1-1.5 cups whole wheat or other starch from Meal Planning Guide  Better-for-you pizza  2-Ingredient High Protein Dough, (or use a whole wheat sha) + ¼ cup red sauce + 1/3 cup cheese + 2-4 ounces of protein (rotisserie chicken, turkey pepperoni, sliced grilled chicken/steak) + veggies of choice  Serve with at least 1 cup of non-starchy vegetables or side salad  Better-for-you pasta  1-1.5 cups cooked Banza/Whole Wheat pasta + 2-4 ounces lean protein + ¼ cup zeynep sauce or ½ cup red sauce or 1-2 tablespoons pesto   Serve with 1-2 cups non-starchy vegetables  Better-for-you mac n' cheese (serves 2)  One box of [place] mac n' cheese made with low-fat milk (Fairlife preferred), add in 1 16 oz bag of cooked frozen broccoli + 2-4 ounces of rotisserie chicken  Roasted [place] Bowl   Build a grain/power bowl:   Choose a starch (2-3 servings feel free to mix/match)  Brown rice, farro, quinoa, millet, barley, couscous  White or sweet potatoes  Solomons squash  Choose a protein (4-5 ounces)  [place]gett, steak (lean cut), chicken, shrimp, edamame, tofu  Vegetables (unlimited amounts)  Try them roasted, steamed, or grilled for variety  Eggplant, broccoli, carrots, bell peppers, green beans, radishes, cucumbers, parsnips, lettuce, cabbage  Dressing/sauce  Vinaigrette, yogurt, warm broth, hot sauce, soy sauce, chimichurri, pesto, or any combinations!  Garnish  Fresh herbs, micro greens, nuts, seeds, toasted coconut  Build a better sandwich:   2 slices Jamil's Killer Bread thin-sliced bread (or other 100 kcal whole  grain bread)   3-4 ounces deli meat  1 sliced reduced-fat cheese or ¼ sliced avocado  unlimited non-starchy vegetables such as lettuce, tomato, onion, pickle, etc.  1 tablespoons sauce (regalado, Ranch, honey mustard, BBQ sauce, hummus), unlimited yellow or brown mustard  1 cup of raw non-starchy vegetables (baby carrots, sugar snap peas, cucumbers, etc.) or ½ cup cooked non-starchy vegetables  Ordering A Better-For-You Salad   Include a lean protein (4-5 ounces), any amount of non-starchy vegetables, and small amount of plant-based fats    Order dressings on the side to better control portion sizes. Add 1-2 tablespoons yourself to lightly coat   Pick 2-3 salad fats, each being ~2 tablespoons:   Cheese   Nuts / Seeds  Olives  Avocado / Guacamole   Sterling  Snack Box Lunch  Option 1:  2 boiled eggs  2 ounces deli meat  ½ bell pepper  11 mini pretzels  ½ cup grapes  2 cheddar cheese bars  Option 2:   2 whole grain Ritz crackers  Light string cheese  Tuna pack, any flavor  1 cup chopped strawberries  Fiber one brownie bar  ½ cup blueberries  Option 3:  1 ounce pretzel crisps  ½ cup apple sliced  2 slices ultra thin provolone cheese  2 ounces deli chicken  4 tbsp powdered peanut butter mixed with water (see directions)  ½ cup blackberries  Option 4:   2 tbsp hummus  3 ounces baby carrots  2 hard boiled eggs  ½ cup grapes  2 ounces deli turkey wrapped around 2 low fat string cheese  Chicken Enchiladas + at least 1 cup of non-starchy vegetables  Cashew Chicken Sheet Pan Meal - serve with 1/2 - 1 cup rice  Alston's Pie - serve with at least 1 cup of non-starchy vegetables  Lemon Silvia Chicken - serve with at least 1 cup non-starchy vegetables + choice of 2-3 servings of carbohydrates listed in Meal Planning Guide  BBQ Grilled Chicken Breast + Brown Sugar Baked Beans + at least 1 cup non-starchy vegetables such as green beans or broccoli    Baked Quesadillas + at least 1 cup non-starchy vegetables   Sloppy Joes serve on a  whole grain bread/bun or a baked potato with at least 1 cup non-starchy vegetables   Chicken Fajita Kabobs serve with brown rice, quinoa, or carb smart tortillas + additional non-starchy vegetables   Turkey Meatball Skillet serve with 1-2 slices garlic/sourdough bread or 1 serving pasta + at least 1 cup non-starchy vegetables   Chicken Broccoli Rodney with at least 1 cup non-starchy vegetables  Taco Stuffed Sweet Potatoes  Pasta Primavera add in additional protein such as shrimp, sliced steak, ground beef, or rotisserie chicken  Air Fryer Chicken Thighs + 2-3 servings carbohydrates + unlimited non-starchy vegetables    Juicy Baked Chicken Breast 6 ways      Snack Ideas  Create a hunger crushing combo with protein, fiber, and fat! Choose two of the three to create a satisfying and filling snack any time of the day!  Flavored Greek yogurt (see brand examples below, but I would try the Magzter PRO 20-gram protein brand first) + piece of fruit  1 portion bag of nuts/trail mix + piece of fruit  Piece of fruit + 2 tablespoons no sugar added nut butter  Errol's and Skippy make on-the-go pre-portioned packets  Protein bar  Look for at least 15 grams of protein  Limit to 1 serving carbohydrates (15 grams)  Ready to drink protein shake   At least 25 grams protein  Bag of Skinny Pop or Smart Food popcorn + 1 low-fat cheese stick/babybel   ½ cup cottage cheese + ¼ cup freeze dried fruit  Any smoothie under 300 calories and 15+ grams of protein  Turkey rollup: 1 carb smart tortilla + 3 oz turkey + 1 tablespoon regalado  3 oz can or packet of tuna + 1 tablespoon regalado with 1 serving of crackers of your choice  1 pack frozen edamame  P3 Protein Pack  Bare Apple Chips + 2 low-fat string cheese  1-2 cups raw vegetables + 1 portion container guacamole or 2-3 tbsp Greek Yogurt Ranch Dip or [place] Greek Yogurt Ranch or 2-3 tbsp hummus  1 serving Tenriism Rice Crisps + ¼ cup salsa and 1 portion container guacamole  1 oz beef jerky + 1 piece  fruit  Strawberry Yogurt Parfait   Protein Pudding  Roasted [place] Chickpeas   2-3 Baked Banana Oatmeal Cups + protein    Sweet Treats  Protein shake  ½ cup berries + a dollop of whipped cream  Outshine Frozen Fruit Bars (2-3 if wanted)  Chocolate Dipped Banana Bites  Raspberry Lemon Greek Frozen Yogurt Bark  1 cup strawberries with 2-3 tbsp chocolate hummus  Any smoothie under 200 calories  Sugar Free Pudding or Jello  All the Best Banana Nice Cream Recipes   Any sweet treat under 200 calories (think mini/fun-sized)    Favorite Websites for Recipe Inspiration  Skinnytaste  Real Food Dietitians   Eating Well  Budget Bytes  Eating Bird Food  Fit Foodie Finds  Well Fed Paleo   9 Meal Prep Success Tips     Dari [place] Meal Prep/Delivery Services  Freshter Meals   Fully Fit Meals   Fresh  Eats       Building A Better Smoothie   Choose one protein:   1/2 cup Greek yogurt  2% Plain Greek yogurt   Oikos Pro Flavored Yogurt  Ready to drink protein shake (limit added sugar at least 25 grams protein)  Orgain - whey or vegan   Corepower - 26 gram  [facility] Nutrition Shakes - 30 gram  Premier Protein  Quest  Protein powder   OWYN - Vegan  Orgain - Whey or Vegan  Tabor - Vegan  Optimum Nutrition Whey Protein  Ghost Whey or Vegan Protein  Make it sweet:   Add 1 cup fresh fruit -or- No Sugar Added frozen fruit   Boost with Veggies (pick 1 or 2):   Instead of ice try frozen cauliflower, riced or florets    Add in a handful of fresh spinach  Beets are a great addition to smoothies, especially paired with strawberries and lemon (can be canned or frozen)  Cucumbers and celery are refreshing ways to enhance nutrition and hydration within your smoothie   Healthy fats:   ½ tbsp no sugar added nut butter  ¼ whole small/medium avocado  1-2 tbsp Flaxseeds   1-2 tbsp Jeovany Seeds   1-2 tbsp Hemp Hearts  1-2 tbsp Unsweetened Coconut Flakes  Liquids add to consistency desired:   Milk: 2% [facility] preferred  Unsweetened/No Sugar Added milk  alternative:   Soy preferred  Ripple preferred  Edmond  Cashew  Optional Nutrition Boosts:   Beet Root Powder  Turmeric + sprinkle of black pepper (pepper is needed for absorption)  Psyllium husk 1-2 tbsp  Spirulina  Cinnamon  Amazing Grass Greens Powder (assorted variety)  Old fashioned oats 2-3 tbsp    Restaurant Tips   Pick 1 out of the 4 Rule: Instead of eating bread/tortilla chips, an appetizer, alcoholic drink and dessert, choose just one to have with your entrée   Focus on lean proteins: Refer to lean meat/meat substitutes page in meal planning guidebook. Select items grilled, baked, broiled, braised, poached or roasted   For your heart health, avoid crispy, crunchy, breaded, paneed or stuffed items and items that are cream based, au gratin or buttered   Order sauces, dressings, and gravies on the side. This way you can add 1-2 tablespoons yourself. This helps with portion control    Request extra non-starchy vegetables instead of a starchy side dish. If the starchy side is something you love, consider splitting it with someone else at the table   Beverages: Order water with lemon, sparkling water, or unsweetened tea. Avoid sugary soft drinks, juices and mixers   Deep fried foods: Enjoy no more than 2x/month  [facility] Eat Fit    Designed to take the guesswork out of dining out healthfully, [facility] Eat Fit makes the healthy choice the easy choice   Visit [email or web address]    Download the [facility] Eat Fit davin for free on your smartphone   Lists of all Eat Fit restaurant partners & dishes by location   Nutrition facts included for all Eat Fit dishes   200+ Eat Fit approved recipes   Eat Fit shopping guide + community wellness resources   Eat Fit Cookbook   Craft: The Eat Fit Guide to Zero Proof Cocktails     Favorite Brands  Protein Bars/Shakes/Powders  Bars  RX Bars  Fit Crunch  THINK  Quest  Shakes  Core Power Shakes (26g or 42g protein)  Fairlife Nutrition Plan (30g protein)   Ensure Max Protein  (30g protein)  Premier Protein Shakes (30g protein)  Boost High Protein (20g protein)   Muscle Milk Genuine Protein Shakes (25g protein)   Quest Protein Shakes (30g protein)   Orgain Protein Shakes (20-26g protein)   Powders  Ghost  Optimum Nutrition   Muscle Milk  Garden of Life  Tabor   Naked   Pasta/Rice  Banza Chickpea Pasta  Barilla Whole Wheat  Parish Rice  Success Boil in Bag Brown Rice  Bread  Jamil's Killer Bread thin sliced  Nature's Own 100% Whole Grain Sugar-Free  Orowheat Whole Wheat Small Slice  [place] Carb Balance Tortillas  Ole Extreme Wellness Tortillas  Flatout Wraps  La Banderita Carb Counter Tortillas  Toufayan Keto Wraps  Dairy  Chobani less sugar  Dannon Light and Fit Yogurt  Fage 2% plain  Oikos Triple Zero  Fairlife 2% Milk  Ripple Milk, unsweetened(milk alternative)  Soy Milk, Vanilla Unsweetened  Good Culture Low-Fat Cottage Cheese  Dressings/Sauces  [facility] Winter [facility] Greek Yogurt Ranch   Sweet Baby Ray's BBQ Sauce - no added sugar  Any ketchup with no added/less added sugar  [place]unts or Tu or Primal Kitchen  G Nayak Sugar Free Sauces   Latham's Own Dressings   Cereal  Kashi GO!  Shredded Wheat  Fiber One  Grape Nuts  Special K Protein  Snacks  Nature's Garden Trail Mix Snack Packs  Pop Chips (like healthier Lays)  Pop Corners (like healthier Doritos)  Racquel's Balanced Breaks  P3 Protein Packs  Skinny Pop Popcorn  Lesser Evil Popcorn  Beanitos Chips  Islam Oats Rice Crisps   Late July Jeovany & Quinoa Tortilla Chips  Great Value Zero Sugar Beef Jerky (be mindful of sodium)          Name: Ez Muniz Sr.   Date: 09/06/2023    Recommended daily energy requirements to reach your goals:  0834-4573 Calories  160-175 grams Protein  210-230 grams Carbohydrates  70-77 grams Fat  100 ounces of fluid per day

## 2023-09-08 ENCOUNTER — OFFICE VISIT (OUTPATIENT)
Dept: PRIMARY CARE CLINIC | Facility: CLINIC | Age: 43
End: 2023-09-08
Payer: COMMERCIAL

## 2023-09-08 DIAGNOSIS — E66.09 CLASS 1 OBESITY DUE TO EXCESS CALORIES WITH SERIOUS COMORBIDITY AND BODY MASS INDEX (BMI) OF 31.0 TO 31.9 IN ADULT: ICD-10-CM

## 2023-09-08 DIAGNOSIS — R73.03 PREDIABETES: Primary | ICD-10-CM

## 2023-09-08 PROCEDURE — 1160F PR REVIEW ALL MEDS BY PRESCRIBER/CLIN PHARMACIST DOCUMENTED: ICD-10-PCS | Mod: CPTII,95,, | Performed by: FAMILY MEDICINE

## 2023-09-08 PROCEDURE — 1160F RVW MEDS BY RX/DR IN RCRD: CPT | Mod: CPTII,95,, | Performed by: FAMILY MEDICINE

## 2023-09-08 PROCEDURE — 1159F MED LIST DOCD IN RCRD: CPT | Mod: CPTII,95,, | Performed by: FAMILY MEDICINE

## 2023-09-08 PROCEDURE — 1159F PR MEDICATION LIST DOCUMENTED IN MEDICAL RECORD: ICD-10-PCS | Mod: CPTII,95,, | Performed by: FAMILY MEDICINE

## 2023-09-08 PROCEDURE — 3044F PR MOST RECENT HEMOGLOBIN A1C LEVEL <7.0%: ICD-10-PCS | Mod: CPTII,95,, | Performed by: FAMILY MEDICINE

## 2023-09-08 PROCEDURE — 3044F HG A1C LEVEL LT 7.0%: CPT | Mod: CPTII,95,, | Performed by: FAMILY MEDICINE

## 2023-09-08 PROCEDURE — 99213 PR OFFICE/OUTPT VISIT, EST, LEVL III, 20-29 MIN: ICD-10-PCS | Mod: 95,,, | Performed by: FAMILY MEDICINE

## 2023-09-08 PROCEDURE — 99213 OFFICE O/P EST LOW 20 MIN: CPT | Mod: 95,,, | Performed by: FAMILY MEDICINE

## 2023-09-08 NOTE — PROGRESS NOTES
Subjective     The patient location is:[place]  The chief complaint leading to consultation is: follow up    Visit type: audiovisual    Face to Face time with patient: 7  12 minutes of total time spent on the encounter, which includes face to face time and non-face to face time preparing to see the patient (eg, review of tests), Obtaining and/or reviewing separately obtained history, Documenting clinical information in the electronic or other health record, Independently interpreting results (not separately reported) and communicating results to the patient/family/caregiver, or Care coordination (not separately reported).         Each patient to whom he or she provides medical services by telemedicine is:  (1) informed of the relationship between the physician and patient and the respective role of any other health care provider with respect to management of the patient; and (2) notified that he or she may decline to receive medical services by telemedicine and may withdraw from such care at any time.    Notes:    Patient ID: Ez Muniz Sr. is a 43 y.o. male.    Chief Complaint: follow up    Pt tolerating metformin (one pill daily). Could not go to higher dosage.     Met with Destini (dietitian) and has done well--was excited about that meeting.     Continues to try lifestyle changes. Has some food options. He has started doing some strength training. Also has increased protein. Still working on increasing fiber.     Anxiety has been ok overall. Sleep is okay.    HPI  Review of Systems   Constitutional:  Negative for activity change, appetite change, fatigue and unexpected weight change.   HENT:  Negative for mouth dryness.    Eyes:  Negative for visual disturbance.   Respiratory:  Negative for apnea, chest tightness and shortness of breath.    Cardiovascular:  Negative for chest pain.   Gastrointestinal:  Negative for abdominal pain.   Musculoskeletal:  Negative for arthralgias and myalgias.    Integumentary:  Negative for rash.   Allergic/Immunologic: Negative for food allergies.   Psychiatric/Behavioral:  Negative for behavioral problems and sleep disturbance. The patient is not nervous/anxious.           Objective     There were no vitals filed for this visit.  Wt Readings from Last 10 Encounters:   07/14/23 100.9 kg (222 lb 7.1 oz)   04/28/23 101 kg (222 lb 10.6 oz)   03/04/22 96.3 kg (212 lb 4.9 oz)   01/20/22 96 kg (211 lb 10.3 oz)   01/19/22 94.4 kg (208 lb 1.8 oz)   12/02/20 95.5 kg (210 lb 10.4 oz)   04/29/19 97.1 kg (214 lb 1.1 oz)   04/17/18 99.5 kg (219 lb 5.7 oz)   01/24/18 100.9 kg (222 lb 7.1 oz)   10/11/17 96.4 kg (212 lb 8.4 oz)       Physical Exam  Constitutional:       Appearance: Normal appearance.   Pulmonary:      Effort: Pulmonary effort is normal. No respiratory distress.   Neurological:      General: No focal deficit present.      Mental Status: He is alert and oriented to person, place, and time.   Psychiatric:         Mood and Affect: Mood normal.         Behavior: Behavior normal.         PAST MEDICAL HISTORY:  Past Medical History:   Diagnosis Date    Anxiety     Hypertension     Insomnia          PAST SURGICAL HISTORY:  Past Surgical History:   Procedure Laterality Date    WISDOM TOOTH EXTRACTION         FAMILY HISTORY:  Family History   Problem Relation Age of Onset    No Known Problems Mother     No Known Problems Father           SOCIAL HISTORY:  Social History     Social History Narrative    Not on file       MEDICATIONS:  Medications have been reviewed.    ALLERGIES:  Allergies have been reviewed.       Assessment and Plan     1. Prediabetes    2. Class 1 obesity due to excess calories with serious comorbidity and body mass index (BMI) of 31.0 to 31.9 in adult          Prediabetes  Class 1 obesity due to excess calories with serious comorbidity and body mass index (BMI) of 31.0 to 31.9 in adult    Will try Wegovy again once off backorder.    Will have staff make pt f/u  appt  Reviewed metformin risks/benefits/se's.

## 2023-11-06 ENCOUNTER — PATIENT MESSAGE (OUTPATIENT)
Dept: PRIMARY CARE CLINIC | Facility: CLINIC | Age: 43
End: 2023-11-06
Payer: COMMERCIAL

## 2023-12-01 ENCOUNTER — OFFICE VISIT (OUTPATIENT)
Dept: URGENT CARE | Facility: CLINIC | Age: 43
End: 2023-12-01
Payer: COMMERCIAL

## 2023-12-01 VITALS
BODY MASS INDEX: 31.91 KG/M2 | OXYGEN SATURATION: 97 % | HEIGHT: 70 IN | TEMPERATURE: 102 F | DIASTOLIC BLOOD PRESSURE: 88 MMHG | RESPIRATION RATE: 18 BRPM | SYSTOLIC BLOOD PRESSURE: 137 MMHG | WEIGHT: 222.88 LBS | HEART RATE: 104 BPM

## 2023-12-01 DIAGNOSIS — B34.9 VIRAL ILLNESS: Primary | ICD-10-CM

## 2023-12-01 DIAGNOSIS — R05.9 COUGH, UNSPECIFIED TYPE: ICD-10-CM

## 2023-12-01 DIAGNOSIS — R09.81 NASAL CONGESTION: ICD-10-CM

## 2023-12-01 DIAGNOSIS — R52 BODY ACHES: ICD-10-CM

## 2023-12-01 DIAGNOSIS — R68.89 FLU-LIKE SYMPTOMS: ICD-10-CM

## 2023-12-01 LAB
CTP QC/QA: YES
CTP QC/QA: YES
POC MOLECULAR INFLUENZA A AGN: NEGATIVE
POC MOLECULAR INFLUENZA B AGN: NEGATIVE
SARS-COV-2 AG RESP QL IA.RAPID: NEGATIVE

## 2023-12-01 PROCEDURE — 87502 POCT INFLUENZA A/B MOLECULAR: ICD-10-PCS | Mod: QW,S$GLB,, | Performed by: FAMILY MEDICINE

## 2023-12-01 PROCEDURE — 87502 INFLUENZA DNA AMP PROBE: CPT | Mod: QW,S$GLB,, | Performed by: FAMILY MEDICINE

## 2023-12-01 PROCEDURE — 99203 OFFICE O/P NEW LOW 30 MIN: CPT | Mod: S$GLB,,, | Performed by: FAMILY MEDICINE

## 2023-12-01 PROCEDURE — 99203 PR OFFICE/OUTPT VISIT, NEW, LEVL III, 30-44 MIN: ICD-10-PCS | Mod: S$GLB,,, | Performed by: FAMILY MEDICINE

## 2023-12-01 PROCEDURE — 87811 SARS CORONAVIRUS 2 ANTIGEN POCT, MANUAL READ: ICD-10-PCS | Mod: QW,S$GLB,, | Performed by: FAMILY MEDICINE

## 2023-12-01 PROCEDURE — 87811 SARS-COV-2 COVID19 W/OPTIC: CPT | Mod: QW,S$GLB,, | Performed by: FAMILY MEDICINE

## 2023-12-01 RX ORDER — OSELTAMIVIR PHOSPHATE 75 MG/1
75 CAPSULE ORAL 2 TIMES DAILY
Qty: 10 CAPSULE | Refills: 0 | Status: SHIPPED | OUTPATIENT
Start: 2023-12-01 | End: 2023-12-06

## 2023-12-01 RX ORDER — PROMETHAZINE HYDROCHLORIDE AND DEXTROMETHORPHAN HYDROBROMIDE 6.25; 15 MG/5ML; MG/5ML
SYRUP ORAL
Qty: 180 ML | Refills: 0 | Status: SHIPPED | OUTPATIENT
Start: 2023-12-01

## 2023-12-01 NOTE — PROGRESS NOTES
"Subjective:      Patient ID: Ez Muniz Sr. is a 43 y.o. male.    Vitals:  height is 5' 9.69" (1.77 m) and weight is 101.1 kg (222 lb 14.2 oz). His tympanic temperature is 102 °F (38.9 °C) (abnormal). His blood pressure is 137/88 and his pulse is 104. His respiration is 18 and oxygen saturation is 97%.     Chief Complaint: Cough    44 yo male Pt presents today with cough, nasal congestion,and sore throat. Pt states that his symptom started 2 days ago and he has taken OTC medication for his symptoms. Pt states that he has a headache that is 7/10. Body aches. Sick contacts.     Cough  This is a new problem. The current episode started in the past 7 days. The problem has been unchanged. The cough is Productive of sputum. Associated symptoms include chills, a fever, headaches, myalgias, nasal congestion, postnasal drip, rhinorrhea and a sore throat. Pertinent negatives include no chest pain, ear congestion, ear pain, heartburn, hemoptysis, rash, shortness of breath, sweats, weight loss or wheezing. Nothing aggravates the symptoms. He has tried OTC cough suppressant for the symptoms. The treatment provided no relief.       Constitution: Positive for chills, fatigue and fever.   HENT:  Positive for postnasal drip and sore throat. Negative for ear pain.    Cardiovascular: Negative.  Negative for chest pain.   Eyes: Negative.    Respiratory:  Positive for cough. Negative for bloody sputum, shortness of breath and wheezing.    Gastrointestinal: Negative.  Negative for heartburn.   Genitourinary: Negative.    Musculoskeletal:  Positive for muscle ache.   Skin:  Negative for rash.   Neurological:  Positive for headaches.   Psychiatric/Behavioral: Negative.        Objective:     Physical Exam   Constitutional: He is oriented to person, place, and time.   HENT:   Head: Normocephalic.   Ears:   Right Ear: Tympanic membrane, external ear and ear canal normal.   Left Ear: Tympanic membrane, external ear and ear canal " normal.   Nose: Congestion present. No purulent discharge or sinus tenderness. No epistaxis.      Comments: Clear congestion  Mouth/Throat: Mucous membranes are moist. No oropharyngeal exudate or posterior oropharyngeal erythema. Oropharynx is clear.   Eyes: Conjunctivae are normal. Pupils are equal, round, and reactive to light.   Neck: Neck supple.   Cardiovascular: Normal rate, regular rhythm, normal heart sounds and normal pulses.   Pulmonary/Chest: Effort normal and breath sounds normal.   Abdominal: Normal appearance. He exhibits no distension. Soft. There is no abdominal tenderness.   Musculoskeletal: Normal range of motion.         General: Normal range of motion.   Lymphadenopathy:     He has no cervical adenopathy.   Neurological: no focal deficit. He is alert and oriented to person, place, and time.   Skin:         Comments: Normal turgor   Psychiatric: His behavior is normal. Mood, judgment and thought content normal.   Nursing note and vitals reviewed.      Assessment:     1. Viral illness    2. Cough, unspecified type    3. Nasal congestion    4. Body aches    5. Flu-like symptoms        Plan:       Viral illness    Cough, unspecified type  -     POCT Influenza A/B MOLECULAR  -     SARS Coronavirus 2 Antigen, POCT Manual Read    Nasal congestion    Body aches    Flu-like symptoms    Other orders  -     oseltamivir (TAMIFLU) 75 MG capsule; Take 1 capsule (75 mg total) by mouth 2 (two) times daily. for 5 days  Dispense: 10 capsule; Refill: 0  -     promethazine-dextromethorphan (PROMETHAZINE-DM) 6.25-15 mg/5 mL Syrp; 5 ml to 10 ml po q 6 hours prn cough/congestion  Dispense: 180 mL; Refill: 0      SUMMARY:  Clinically Flu. Contact precautions. Supportive measures. Antiviral. Handout on flu given as well. Immediate medical provider evaluation if worsens.     Patient Instructions   Thank you for allowing our team to take care of you today.  Your diagnosis is the viral flu-like illness.  You are contagious  so be careful around those around you.  Adding Tamiflu to take one pill twice a day for five days.  Promethazine DM if needed for cough/congestion.  If any symptoms continue or worsen, get an immediate medical provider/ER evaluation.      SYMPTOM MEDICATIONS IF NEEDED AND APPROPRIATE:    You may gargle with warm salt water 4 times a day as needed.      Make sure you are getting rest and drinking lots of fluids.    You can use cough drops or lozenges to soothe your sore throat.     You can also take Vitamin D, C, Zinc to help boost your immune system.    Flonase nasal spray can be used for nasal congestion. Two sprays each nostril daily.     Honey is a natural cough suppressant that can be used.    Make sure to stay well hydrated.    Use Nasal Saline to mechanically move any post nasal drip from your eustachian tube or from the back of your throat.    A Humidifier can be used to keep moisture in the air.       PAIN/DISCOMFORT:  Tylenol and Ibuprofen can be alternated every 4 hours if needed for aches or fever.     If your symptoms do not improve, get a medical provider/ER evaluation. Followup here as needed. If your symptoms worsen, go to the emergency room.

## 2023-12-01 NOTE — PATIENT INSTRUCTIONS
Thank you for allowing our team to take care of you today.  Your diagnosis is the viral flu-like illness.  You are contagious so be careful around those around you.  Adding Tamiflu to take one pill twice a day for five days.  Promethazine DM if needed for cough/congestion.  If any symptoms continue or worsen, get an immediate medical provider/ER evaluation.      SYMPTOM MEDICATIONS IF NEEDED AND APPROPRIATE:    You may gargle with warm salt water 4 times a day as needed.      Make sure you are getting rest and drinking lots of fluids.    You can use cough drops or lozenges to soothe your sore throat.     You can also take Vitamin D, C, Zinc to help boost your immune system.    Flonase nasal spray can be used for nasal congestion. Two sprays each nostril daily.     Honey is a natural cough suppressant that can be used.    Make sure to stay well hydrated.    Use Nasal Saline to mechanically move any post nasal drip from your eustachian tube or from the back of your throat.    A Humidifier can be used to keep moisture in the air.       PAIN/DISCOMFORT:  Tylenol and Ibuprofen can be alternated every 4 hours if needed for aches or fever.     If your symptoms do not improve, get a medical provider/ER evaluation. Followup here as needed. If your symptoms worsen, go to the emergency room.

## 2024-08-05 ENCOUNTER — TELEPHONE (OUTPATIENT)
Dept: INTERNAL MEDICINE | Facility: CLINIC | Age: 44
End: 2024-08-05

## 2024-08-05 NOTE — TELEPHONE ENCOUNTER
----- Message from Laurie Andrew sent at 8/5/2024 10:58 AM CDT -----  Contact: self  .Type: Orders Request    What orders/ testing are being requested? Labs     Is there a future appointment scheduled for the patient with PCP? Yes      When? 10/14    Would you prefer a response via [employer]? Call back     Comments: .[phone]

## 2024-09-13 ENCOUNTER — OFFICE VISIT (OUTPATIENT)
Dept: INTERNAL MEDICINE | Facility: CLINIC | Age: 44
End: 2024-09-13
Payer: COMMERCIAL

## 2024-09-13 VITALS
HEIGHT: 70 IN | BODY MASS INDEX: 29.26 KG/M2 | HEART RATE: 80 BPM | WEIGHT: 204.38 LBS | RESPIRATION RATE: 20 BRPM | DIASTOLIC BLOOD PRESSURE: 76 MMHG | OXYGEN SATURATION: 99 % | SYSTOLIC BLOOD PRESSURE: 124 MMHG | TEMPERATURE: 97 F

## 2024-09-13 DIAGNOSIS — Z00.00 ANNUAL PHYSICAL EXAM: Primary | ICD-10-CM

## 2024-09-13 DIAGNOSIS — R73.03 PREDIABETES: ICD-10-CM

## 2024-09-13 DIAGNOSIS — G47.00 INSOMNIA, UNSPECIFIED TYPE: ICD-10-CM

## 2024-09-13 DIAGNOSIS — F41.9 ANXIETY: ICD-10-CM

## 2024-09-13 PROCEDURE — 99999 PR PBB SHADOW E&M-EST. PATIENT-LVL IV: CPT | Mod: PBBFAC,,, | Performed by: INTERNAL MEDICINE

## 2024-09-13 PROCEDURE — 3008F BODY MASS INDEX DOCD: CPT | Mod: CPTII,S$GLB,, | Performed by: INTERNAL MEDICINE

## 2024-09-13 PROCEDURE — 99396 PREV VISIT EST AGE 40-64: CPT | Mod: S$GLB,,, | Performed by: INTERNAL MEDICINE

## 2024-09-13 PROCEDURE — 99213 OFFICE O/P EST LOW 20 MIN: CPT | Mod: 25,S$GLB,, | Performed by: INTERNAL MEDICINE

## 2024-09-13 PROCEDURE — 3074F SYST BP LT 130 MM HG: CPT | Mod: CPTII,S$GLB,, | Performed by: INTERNAL MEDICINE

## 2024-09-13 PROCEDURE — 1159F MED LIST DOCD IN RCRD: CPT | Mod: CPTII,S$GLB,, | Performed by: INTERNAL MEDICINE

## 2024-09-13 PROCEDURE — 1160F RVW MEDS BY RX/DR IN RCRD: CPT | Mod: CPTII,S$GLB,, | Performed by: INTERNAL MEDICINE

## 2024-09-13 PROCEDURE — 3078F DIAST BP <80 MM HG: CPT | Mod: CPTII,S$GLB,, | Performed by: INTERNAL MEDICINE

## 2024-09-13 RX ORDER — HYDROXYZINE HYDROCHLORIDE 25 MG/1
25 TABLET, FILM COATED ORAL 3 TIMES DAILY PRN
Qty: 60 TABLET | Refills: 1 | Status: SHIPPED | OUTPATIENT
Start: 2024-09-13

## 2024-09-13 RX ORDER — ESCITALOPRAM OXALATE 20 MG/1
20 TABLET ORAL DAILY
Qty: 90 TABLET | Refills: 3 | Status: SHIPPED | OUTPATIENT
Start: 2024-09-13

## 2024-09-13 RX ORDER — TRAZODONE HYDROCHLORIDE 50 MG/1
50 TABLET ORAL NIGHTLY PRN
Qty: 30 TABLET | Refills: 5 | Status: SHIPPED | OUTPATIENT
Start: 2024-09-13

## 2024-09-13 NOTE — PROGRESS NOTES
Ez Muniz Sr.  44 y.o. Black or  male  [id number]    Chief Complaint:  Chief Complaint   Patient presents with    Annual Exam       History of Present Illness:  History of Present Illness    CHIEF COMPLAINT:  Mr. Muniz presents today for annual visit with concerns about difficulty sleeping and anxiety.    SLEEP AND ANXIETY:  He reports difficulty sleeping, which has worsened since discontinuing Lexapro. He experiences waking up with racing thoughts, indicating anxiety is a significant factor in his sleep issues. He recently stopped taking Ambien, finding it ineffective. He realized Lexapro's effectiveness in managing anxiety after discontinuation, noting significantly worse anxiety symptoms without the medication. His wife also noticed improvement in his condition while he was taking Lexapro. He expresses interest in restarting Lexapro for anxiety management.    WEIGHT AND LIFESTYLE:  He reports his weight has been stable, with a recent loss of a couple lbs after a previous increase over the last year. He is currently focused on working out. His previous A1C was slightly elevated, in the prediabetes range, which will be rechecked with upcoming lab work.    VACCINATIONS:  He is unsure about his flu vaccine status. He reports having received the COVID vaccine previously but is currently debating whether to get another dose. He does not typically receive the flu vaccine.    PHARMACY PREFERENCES:  He prefers to use mail order pharmacy for Lexapro refills. For new medications, he requests they be sent to his local [facility] pharmacy.         Review of Systems   HENT:  Negative for hearing loss.    Eyes:  Negative for discharge.   Respiratory:  Negative for wheezing.    Cardiovascular:  Negative for chest pain and palpitations.   Gastrointestinal:  Negative for blood in stool, constipation, diarrhea and vomiting.   Genitourinary:  Negative for hematuria and urgency.   Musculoskeletal:  Negative for  neck pain.   Neurological:  Negative for weakness and headaches.   Endo/Heme/Allergies:  Negative for polydipsia.   Psychiatric/Behavioral:  The patient is nervous/anxious and has insomnia.      Answers submitted by the patient for this visit:  Review of Systems Questionnaire (Submitted on 9/11/2024)  activity change: No  unexpected weight change: No  neck pain: No  hearing loss: No  rhinorrhea: No  trouble swallowing: No  eye discharge: No  visual disturbance: No  chest tightness: No  wheezing: No  chest pain: No  palpitations: No  blood in stool: No  constipation: No  vomiting: No  diarrhea: No  polydipsia: No  polyuria: No  difficulty urinating: No  urgency: No  hematuria: No  joint swelling: No  arthralgias: No  headaches: No  weakness: No  confusion: No  dysphoric mood: No    Laboratory  Lab Results   Component Value Date    WBC 4.20 04/28/2023    HGB 14.5 04/28/2023    HCT 44.6 04/28/2023     04/28/2023    CHOL 223 (H) 04/28/2023    TRIG 65 04/28/2023    HDL 60 04/28/2023    ALT 20 04/28/2023    AST 23 04/28/2023     04/28/2023    K 4.4 04/28/2023     04/28/2023    CREATININE 1.1 04/28/2023    BUN 15 04/28/2023    CO2 26 04/28/2023    TSH 1.901 04/28/2023    HGBA1C 5.9 (H) 04/28/2023     Lab Results   Component Value Date    LDLCALC 150.0 04/28/2023       The following were reviewed: Active problem list, medication list, allergies, family history, social history, and Health Maintenance.     History:  Past Medical History:   Diagnosis Date    Anxiety     Hypertension     Insomnia      Past Surgical History:   Procedure Laterality Date    WISDOM TOOTH EXTRACTION       Family History   Problem Relation Name Age of Onset    No Known Problems Mother      No Known Problems Father       Social History     Socioeconomic History    Marital status:    Tobacco Use    Smoking status: Never    Smokeless tobacco: Never   Substance and Sexual Activity    Alcohol use: Yes    Drug use: No    Sexual  activity: Yes     Partners: Female     Social Determinants of Health     Financial Resource Strain: Low Risk  (9/11/2024)    Overall Financial Resource Strain (CARDIA)     Difficulty of Paying Living Expenses: Not hard at all   Food Insecurity: No Food Insecurity (9/11/2024)    Hunger Vital Sign     Worried About Running Out of Food in the Last Year: Never true     Ran Out of Food in the Last Year: Never true   Physical Activity: Insufficiently Active (9/11/2024)    Exercise Vital Sign     Days of Exercise per Week: 3 days     Minutes of Exercise per Session: 40 min   Stress: Stress Concern Present (9/11/2024)    [place] [employer] - Occupational Stress Questionnaire     Feeling of Stress : Very much   Housing Stability: Unknown (9/11/2024)    Housing Stability Vital Sign     Unable to Pay for Housing in the Last Year: No     Patient Active Problem List   Diagnosis    Generalized anxiety disorder    Obesity (BMI 30.0-34.9)    Abnormal EKG    Chronic fatigue    Class 1 obesity due to excess calories with serious comorbidity and body mass index (BMI) of 31.0 to 31.9 in adult    Prediabetes     Review of patient's allergies indicates:  No Known Allergies    Health Maintenance  Health Maintenance Topics with due status: Not Due       Topic Last Completion Date    TETANUS VACCINE 10/11/2017    Lipid Panel 04/28/2023     Health Maintenance Due   Topic Date Due    Hemoglobin A1c (Prediabetes)  04/28/2024    Influenza Vaccine (1) 09/01/2024    COVID-19 Vaccine (4 - 2023-24 season) 09/01/2024       Medications:  Current Outpatient Medications on File Prior to Visit   Medication Sig Dispense Refill    zolpidem (AMBIEN) 10 mg Tab TK 1 T PO QD HS       No current facility-administered medications on file prior to visit.       Medications have been reviewed and reconciled with patient at visit today.    Exam:  Vitals:    09/13/24 1054   BP: 124/76   Pulse: 80   Resp: 20   Temp: 97.2 °F (36.2 °C)     Weight:  92.7 kg (204 lb 5.9 oz)   Body mass index is 29.58 kg/m².      Physical Exam    Vitals: Reviewed.  Constitutional: No acute distress. Well-developed. Not ill-appearing.  Eyes: No scleral icterus.  Neck: No cervical lymphadenopathy.  Cardiovascular: Normal rate and regular rhythm. Normal heart sounds.  Pulmonary: Pulmonary effort is normal. No respiratory distress. Normal breath sounds.  Abdomen: Soft. Nontender. Nondistended. Normoactive bowel sounds.  Extremities: No edema.  Skin: Warm. Dry.  Neurological: Alert and oriented to person, place, and time.  Psychiatric: Behavior normal.         Assessment:  The primary encounter diagnosis was Annual physical exam. Diagnoses of Anxiety, Insomnia, unspecified type, and Prediabetes were also pertinent to this visit.    Assessment & Plan    ANNUAL PHYSICAL EXAM:  - Counseled regarding age appropriate screenings and immunizations  - Counseled regarding lifestyle modifications    ANXIETY:  - Determined anxiety is a significant factor in sleep disturbances.  - Recognized effectiveness of previous Lexapro treatment for anxiety management.  - Will restart Lexapro for anxiety control.  - Considered adding an as-needed medication for anxiety during Lexapro titration.  - Explained that Lexapro's effectiveness may not be apparent until discontinuation.  - Started Lexapro for anxiety management (to be sent to mail order pharmacy).  - Started new as-needed medication for anxiety, can also be taken in the evening to aid in relaxation (to be sent to local [facility] pharmacy).    SLEEP DISTURBANCES:  - Chose to initiate trazodone for sleep issues, replacing ineffective Ambien.  - Discussed trazodone's potential benefits for both sleep and anxiety.  - Started trazodone for sleep, to be taken at nighttime (to be sent to local [facility] pharmacy).    PREDIABETES:  - Noted A1C was previously in prediabetes range, requiring follow-up testing.    LABS:  - Fasting lab work scheduled for  Monday, including A1C test.    FOLLOW UP:  - Contact the office if any additional vaccines are due.

## 2024-09-20 ENCOUNTER — LAB VISIT (OUTPATIENT)
Dept: LAB | Facility: HOSPITAL | Age: 44
End: 2024-09-20
Attending: INTERNAL MEDICINE
Payer: COMMERCIAL

## 2024-09-20 DIAGNOSIS — Z00.00 ANNUAL PHYSICAL EXAM: ICD-10-CM

## 2024-09-20 LAB
ALBUMIN SERPL BCP-MCNC: 3.7 G/DL (ref 3.5–5.2)
ALP SERPL-CCNC: 54 U/L (ref 55–135)
ALT SERPL W/O P-5'-P-CCNC: 21 U/L (ref 10–44)
ANION GAP SERPL CALC-SCNC: 6 MMOL/L (ref 8–16)
AST SERPL-CCNC: 24 U/L (ref 10–40)
BASOPHILS # BLD AUTO: 0.03 K/UL (ref 0–0.2)
BASOPHILS NFR BLD: 0.9 % (ref 0–1.9)
BILIRUB SERPL-MCNC: 0.5 MG/DL (ref 0.1–1)
BUN SERPL-MCNC: 17 MG/DL (ref 6–20)
CALCIUM SERPL-MCNC: 8.9 MG/DL (ref 8.7–10.5)
CHLORIDE SERPL-SCNC: 110 MMOL/L (ref 95–110)
CHOLEST SERPL-MCNC: 204 MG/DL (ref 120–199)
CHOLEST/HDLC SERPL: 3.5 {RATIO} (ref 2–5)
CO2 SERPL-SCNC: 24 MMOL/L (ref 23–29)
CREAT SERPL-MCNC: 1.1 MG/DL (ref 0.5–1.4)
DIFFERENTIAL METHOD BLD: ABNORMAL
EOSINOPHIL # BLD AUTO: 0.1 K/UL (ref 0–0.5)
EOSINOPHIL NFR BLD: 1.4 % (ref 0–8)
ERYTHROCYTE [DISTWIDTH] IN BLOOD BY AUTOMATED COUNT: 12.6 % (ref 11.5–14.5)
EST. GFR  (NO RACE VARIABLE): >60 ML/MIN/1.73 M^2
ESTIMATED AVG GLUCOSE: 105 MG/DL (ref 68–131)
GLUCOSE SERPL-MCNC: 86 MG/DL (ref 70–110)
HBA1C MFR BLD: 5.3 % (ref 4–5.6)
HCT VFR BLD AUTO: 42.6 % (ref 40–54)
HDLC SERPL-MCNC: 59 MG/DL (ref 40–75)
HDLC SERPL: 28.9 % (ref 20–50)
HGB BLD-MCNC: 13.7 G/DL (ref 14–18)
IMM GRANULOCYTES # BLD AUTO: 0.01 K/UL (ref 0–0.04)
IMM GRANULOCYTES NFR BLD AUTO: 0.3 % (ref 0–0.5)
LDLC SERPL CALC-MCNC: 136.6 MG/DL (ref 63–159)
LYMPHOCYTES # BLD AUTO: 1.7 K/UL (ref 1–4.8)
LYMPHOCYTES NFR BLD: 49.4 % (ref 18–48)
MCH RBC QN AUTO: 29.3 PG (ref 27–31)
MCHC RBC AUTO-ENTMCNC: 32.2 G/DL (ref 32–36)
MCV RBC AUTO: 91 FL (ref 82–98)
MONOCYTES # BLD AUTO: 0.2 K/UL (ref 0.3–1)
MONOCYTES NFR BLD: 6.6 % (ref 4–15)
NEUTROPHILS # BLD AUTO: 1.4 K/UL (ref 1.8–7.7)
NEUTROPHILS NFR BLD: 41.4 % (ref 38–73)
NONHDLC SERPL-MCNC: 145 MG/DL
NRBC BLD-RTO: 0 /100 WBC
PLATELET # BLD AUTO: 274 K/UL (ref 150–450)
PMV BLD AUTO: 9.8 FL (ref 9.2–12.9)
POTASSIUM SERPL-SCNC: 4.3 MMOL/L (ref 3.5–5.1)
PROT SERPL-MCNC: 6.4 G/DL (ref 6–8.4)
RBC # BLD AUTO: 4.67 M/UL (ref 4.6–6.2)
SODIUM SERPL-SCNC: 140 MMOL/L (ref 136–145)
TRIGL SERPL-MCNC: 42 MG/DL (ref 30–150)
WBC # BLD AUTO: 3.48 K/UL (ref 3.9–12.7)

## 2024-09-20 PROCEDURE — 80053 COMPREHEN METABOLIC PANEL: CPT | Performed by: INTERNAL MEDICINE

## 2024-09-20 PROCEDURE — 82306 VITAMIN D 25 HYDROXY: CPT | Performed by: INTERNAL MEDICINE

## 2024-09-20 PROCEDURE — 80061 LIPID PANEL: CPT | Performed by: INTERNAL MEDICINE

## 2024-09-20 PROCEDURE — 36415 COLL VENOUS BLD VENIPUNCTURE: CPT | Mod: PO | Performed by: INTERNAL MEDICINE

## 2024-09-20 PROCEDURE — 84443 ASSAY THYROID STIM HORMONE: CPT | Performed by: INTERNAL MEDICINE

## 2024-09-20 PROCEDURE — 83036 HEMOGLOBIN GLYCOSYLATED A1C: CPT | Performed by: INTERNAL MEDICINE

## 2024-09-20 PROCEDURE — 85025 COMPLETE CBC W/AUTO DIFF WBC: CPT | Performed by: INTERNAL MEDICINE

## 2024-09-20 PROCEDURE — 83540 ASSAY OF IRON: CPT | Performed by: INTERNAL MEDICINE

## 2024-09-21 LAB
25(OH)D3+25(OH)D2 SERPL-MCNC: 64 NG/ML (ref 30–96)
IRON SERPL-MCNC: 73 UG/DL (ref 45–160)
SATURATED IRON: 20 % (ref 20–50)
TOTAL IRON BINDING CAPACITY: 370 UG/DL (ref 250–450)
TRANSFERRIN SERPL-MCNC: 250 MG/DL (ref 200–375)
TSH SERPL DL<=0.005 MIU/L-ACNC: 0.61 UIU/ML (ref 0.4–4)

## 2024-12-13 ENCOUNTER — OFFICE VISIT (OUTPATIENT)
Dept: INTERNAL MEDICINE | Facility: CLINIC | Age: 44
End: 2024-12-13
Payer: COMMERCIAL

## 2024-12-13 VITALS
DIASTOLIC BLOOD PRESSURE: 76 MMHG | RESPIRATION RATE: 20 BRPM | BODY MASS INDEX: 29.58 KG/M2 | WEIGHT: 204.38 LBS | HEART RATE: 70 BPM | SYSTOLIC BLOOD PRESSURE: 122 MMHG | OXYGEN SATURATION: 99 % | TEMPERATURE: 97 F

## 2024-12-13 DIAGNOSIS — R06.83 SNORING: ICD-10-CM

## 2024-12-13 DIAGNOSIS — F41.1 GENERALIZED ANXIETY DISORDER: ICD-10-CM

## 2024-12-13 DIAGNOSIS — F51.01 PRIMARY INSOMNIA: Primary | ICD-10-CM

## 2024-12-13 PROCEDURE — 99999 PR PBB SHADOW E&M-EST. PATIENT-LVL III: CPT | Mod: PBBFAC,,, | Performed by: PHYSICIAN ASSISTANT

## 2024-12-13 NOTE — PROGRESS NOTES
Subjective:      Patient ID: Ez Muniz Sr. is a 44 y.o. male.    Chief Complaint: Insomnia (He is here due to insomnia, states it has been an issue for a while and would like to start to take care of this issue. )    Patient is known to me, being seen today for insomnia.    Anxiety- lexapro 20mg, hydroxyzine 25mg tid prn   Not taking hydroxyzine, lexapro doing well   Trazodone 50mg prn for insomnia, its puts him to sleep and sleeps well with medication but feels groggy the next morning   Previously on ambien but d/c as felt it wasn't helpful  Doesn't ever feel rested     Has tried otc agents which help him go to sleep but can't stay asleep     +snoring     Some constipation since holidays started   Has tried probiotic, ducolax, miralax  Symptoms are improving     Last visit Sept 2024 w PCP.       Review of Systems   Constitutional:  Negative for chills, diaphoresis and fever.   HENT:  Negative for congestion, rhinorrhea and sore throat.    Respiratory:  Negative for cough, shortness of breath and wheezing.    Gastrointestinal:  Positive for constipation. Negative for abdominal pain, blood in stool, diarrhea, nausea and vomiting.        +gas   Skin:  Negative for rash.   Neurological:  Negative for dizziness, light-headedness and headaches.       Objective:   /76 (BP Location: Left arm, Patient Position: Sitting)   Pulse 70   Temp 96.6 °F (35.9 °C) (Tympanic)   Resp 20   Wt 92.7 kg (204 lb 5.9 oz)   SpO2 99%   BMI 29.58 kg/m²   Physical Exam  Constitutional:       General: He is not in acute distress.     Appearance: He is well-developed. He is not ill-appearing or diaphoretic.   HENT:      Head: Normocephalic and atraumatic.      Right Ear: External ear normal.      Left Ear: External ear normal.   Eyes:      General: Lids are normal.         Right eye: No discharge.         Left eye: No discharge.      Conjunctiva/sclera: Conjunctivae normal.      Right eye: Right conjunctiva is not injected.       Left eye: Left conjunctiva is not injected.   Pulmonary:      Effort: Pulmonary effort is normal. No respiratory distress.   Skin:     General: Skin is warm and dry.      Findings: No rash.   Neurological:      Mental Status: He is alert and oriented to person, place, and time.   Psychiatric:         Speech: Speech normal.         Behavior: Behavior normal.         Thought Content: Thought content normal.         Judgment: Judgment normal.       Assessment:      1. Primary insomnia    2. Generalized anxiety disorder    3. Snoring    4. BMI 29.0-29.9,adult       Plan:   Primary insomnia  -     Home Sleep Study; Future    Generalized anxiety disorder   Lexapro working well     Snoring  -     Home Sleep Study; Future    BMI 29.0-29.9,adult  -     Home Sleep Study; Future      Ensure adequate hydration and fiber, daily probiotic/stool softener, miralax prn     Trial half tablet trazodone OR hydroxyzine for insomnia   If no improvement, he will let us know  Also r/o PEDRITO as contributor to nighttime wakings     Discussed worsening signs/symptoms and when to return to clinic or go to ED.   Patient expresses understanding and agrees with treatment plan.

## 2024-12-16 ENCOUNTER — TELEPHONE (OUTPATIENT)
Dept: SLEEP MEDICINE | Facility: CLINIC | Age: 44
End: 2024-12-16
Payer: COMMERCIAL

## 2024-12-16 NOTE — TELEPHONE ENCOUNTER
----- Message from Tech Leona sent at 12/13/2024  1:13 PM CST -----  [facility]

## 2025-01-06 ENCOUNTER — HOSPITAL ENCOUNTER (OUTPATIENT)
Dept: SLEEP MEDICINE | Facility: HOSPITAL | Age: 45
Discharge: HOME | End: 2025-01-06
Attending: PHYSICIAN ASSISTANT
Payer: COMMERCIAL

## 2025-01-06 DIAGNOSIS — F51.01 PRIMARY INSOMNIA: ICD-10-CM

## 2025-01-06 DIAGNOSIS — R06.83 SNORING: ICD-10-CM

## 2025-01-06 PROCEDURE — 95806 SLEEP STUDY UNATT&RESP EFFT: CPT | Performed by: INTERNAL MEDICINE

## 2025-01-06 NOTE — PROCEDURES
"PHYSICIAN INTERPRETATION AND COMMENTS: Clinically significant sleep disordered breathing is not identified.  Indications of cardiac dysrhythmia are recorded. Please refer to sleep disorders clinic  CLINICAL HISTORY: 44 year old male. BMI: 29.58 kg/mB2. Snoring, Insomnia. on Trazodone.  SLEEP STUDY FINDINGS: Patient underwent a 1 night Home Sleep Test and by behavioral criteria, slept for approximately  6.89 hours, with a sleep latency of 6 minutes and a sleep efficiency of 99%. The patient slept supine 67.8% of the night  based on valid recording time of 6.9 hours. Snoring occurs for 10.1% (30 dB) of the study, 1.7% is very loud. The mean pulse  rate is 63.8 BPM, with frequent pulse rate variability (51 events with >= 6 BPM increase/decrease per hour).  TREATMENT CONSIDERATIONS: Primary snoringConsider InLAB polysomnography  DISEASE MANAGEMENT CONSIDERATIONS: None.      Beau Lawson, Anu TIWARI, PA-EARL  [address] [place] [address] MARCUS [place]HERTY [id number]/Lin Villagomez, DO         The sleep study that you ordered is complete.  You have ordered sleep LAB services to perform the sleep study for Ez Muniz . .      Please find Sleep Study result in  the "Media tab" of Chart Review menu.        You can look  for the report in the  Media by the document type "Sleep Study Documents". Alphabetizing  "Document type" column helps to find the SLEEP STUDY report  Faster.       As the ordering provider, you are responsible for reviewing the results and implementing a treatment plan with your patient.    If you need a Sleep Medicine provider to explain the sleep study findings and arrange treatment for the patient, please refer patient for consultation to our Sleep Clinic via [facility] with Ambulatory Consult Sleep.     To do that please place an order for an  "Ambulatory Consult Sleep" -  order , it will go to our clinic work queue for our staff  to contact the patient for an appointment.      For any questions, " please contact our sleep lab  staff at [phone] to talk to clinical staff          Orlando Hebert MD

## 2025-01-07 ENCOUNTER — PATIENT MESSAGE (OUTPATIENT)
Dept: PULMONOLOGY | Facility: CLINIC | Age: 45
End: 2025-01-07
Payer: COMMERCIAL

## 2025-01-07 ENCOUNTER — PATIENT MESSAGE (OUTPATIENT)
Dept: INTERNAL MEDICINE | Facility: CLINIC | Age: 45
End: 2025-01-07
Payer: COMMERCIAL

## 2025-01-10 ENCOUNTER — OFFICE VISIT (OUTPATIENT)
Dept: PULMONOLOGY | Facility: CLINIC | Age: 45
End: 2025-01-10
Payer: COMMERCIAL

## 2025-01-10 VITALS — HEIGHT: 69 IN | WEIGHT: 204.38 LBS | BODY MASS INDEX: 30.27 KG/M2

## 2025-01-10 DIAGNOSIS — F51.01 PRIMARY INSOMNIA: Primary | ICD-10-CM

## 2025-01-10 DIAGNOSIS — R06.83 SNORING: ICD-10-CM

## 2025-01-10 DIAGNOSIS — I49.9 CARDIAC ARRHYTHMIA, UNSPECIFIED CARDIAC ARRHYTHMIA TYPE: ICD-10-CM

## 2025-01-10 PROBLEM — E66.811 CLASS 1 OBESITY DUE TO EXCESS CALORIES WITH SERIOUS COMORBIDITY AND BODY MASS INDEX (BMI) OF 31.0 TO 31.9 IN ADULT: Status: RESOLVED | Noted: 2023-04-28 | Resolved: 2025-01-10

## 2025-01-10 PROBLEM — E66.09 CLASS 1 OBESITY DUE TO EXCESS CALORIES WITH SERIOUS COMORBIDITY AND BODY MASS INDEX (BMI) OF 31.0 TO 31.9 IN ADULT: Status: RESOLVED | Noted: 2023-04-28 | Resolved: 2025-01-10

## 2025-01-10 NOTE — PROGRESS NOTES
Subjective:       Patient ID: Ez Muniz Sr. is a 44 y.o. male.    Chief Complaint: insomnia    The patient location is: home in [place]  The chief complaint leading to consultation is: tired    Visit type: audiovisual    Face to Face time with patient: 20 minutes  31 minutes of total time spent on the encounter, which includes face to face time and non-face to face time preparing to see the patient (eg, review of tests), Obtaining and/or reviewing separately obtained history, Documenting clinical information in the electronic or other health record, Independently interpreting results (not separately reported) and communicating results to the patient/family/caregiver, or Care coordination (not separately reported).         Each patient to whom he or she provides medical services by telemedicine is:  (1) informed of the relationship between the physician and patient and the respective role of any other health care provider with respect to management of the patient; and (2) notified that he or she may decline to receive medical services by telemedicine and may withdraw from such care at any time.    Notes:     1/10/2025  New patient here for sleep problem  Complains of not being able to stay asleep  No trouble falling asleep  No restless legs, witnessed apneas, sleep walking or acting out dreams  Just wakes up through the night  Gets at least 6 hours of sleep   Works shift work;  at [employer]  Does not drink caffeine, alcohol, or smoking prior to sleep  Takes trazodone for sleep - started this in November; it helps him sleep but he feels groggy when waking up  He denies trouble staying awake at work, able to perform his duties but is just tired  He completed home sleep study; no sleep disordered breathing identified, cardiac dysrhythmia possible  Saw a cardiologist a couple years ago, normal EKG    BP Readings from Last 3 Encounters:   12/13/24 122/76   09/13/24 124/76   12/01/23 137/88     Snoring /  Sleep:     [place] Questionnaire (validated PEDRITO screening questionnaire)    yes -- Snoring/apnea    yes -- Fatigue    Body mass index is 30.18 kg/m².  (>25 is overweight, >30 is obese)    Blood Pressure = normal blood pressure  (PreHTN 120-139/80-89, Stg1 140-159/90-99, Stg2 >160/>100)  [place] = 2 of three PEDRITO categories are positive (high risk is 2-3 positive categories)     [place] Sleepiness Scale TOTAL =  4  (validated sleepiness questionnaire with a higher score indicating greater sleepiness; range 0-24)      STOP-Bang Questionnaire (validated PEDRITO screening questionnaire)  Negative unless checked off.  [x] Snoring    [x]  Tired/Fatigued/Sleepy  [] Obstruction (apneas/choking)  [] Pressure (HTN)  [] BMI >35  [] Age >50  [] Neck >40 cm  [x] Gender male   STOP-Bang = 3 (low risk 0-2,high risk 3-8)    Neck circumference 15 inches (measured by patient at home with tape measure) [?PEDRITO risk if >43cm (17in) male or >41cm (15.5 in) female]            Immunization History   Administered Date(s) Administered    COVID-19, MRNA, LN-S, PF (Pfizer) (Purple Cap) 03/31/2021, 04/21/2021, 12/29/2021    DTaP 1980, 1980, 1980, 1982, 01/22/1987    Hepatitis B, Pediatric/Adolescent 05/25/1999, 08/02/1999, 02/09/2001    IPV 1982, 1982, 1982, 08/30/1983, 01/22/1987    Influenza - Quadrivalent - PF *Preferred* (6 months and older) 03/02/2015, 03/02/2015, 03/02/2015    Influenza - Trivalent - Fluarix, Flulaval, Fluzone, Afluria - PF 03/02/2015    MMR 08/30/1983, 05/25/1999    Td (ADULT) 05/25/1999    Tdap 03/02/2015      Tobacco Use: Low Risk  (1/10/2025)    Patient History     Smoking Tobacco Use: Never     Smokeless Tobacco Use: Never     Passive Exposure: Not on file      Past Medical History:   Diagnosis Date    Anxiety     Hypertension     Insomnia       Current Outpatient Medications on File Prior to Visit   Medication Sig Dispense Refill    EScitalopram oxalate (LEXAPRO) 20 MG tablet Take 1  "tablet (20 mg total) by mouth once daily. 90 tablet 3    hydrOXYzine HCL (ATARAX) 25 MG tablet Take 1 tablet (25 mg total) by mouth 3 (three) times daily as needed for Anxiety. 60 tablet 1    traZODone (DESYREL) 50 MG tablet Take 1 tablet (50 mg total) by mouth nightly as needed for Insomnia. 30 tablet 5     No current facility-administered medications on file prior to visit.        Review of Systems   Constitutional:  Positive for fatigue. Negative for fever, weight loss, appetite change and weakness.   HENT:  Negative for postnasal drip, rhinorrhea, sinus pressure, trouble swallowing and congestion.    Respiratory:  Positive for somnolence. Negative for cough, sputum production, choking, chest tightness, shortness of breath, wheezing and dyspnea on extertion.    Cardiovascular:  Negative for chest pain and leg swelling.   Musculoskeletal:  Negative for arthralgias, gait problem and joint swelling.   Gastrointestinal:  Negative for nausea, vomiting and abdominal pain.   Neurological:  Negative for dizziness, weakness and headaches.   All other systems reviewed and are negative.      Objective:       Vitals:    01/10/25 0919   Weight: 92.7 kg (204 lb 5.9 oz)   Height: 5' 9" (1.753 m)       Physical Exam   Constitutional: He is oriented to person, place, and time. He appears well-developed and well-nourished. No distress.   HENT:   Mouth/Throat: Oropharynx is clear and moist.   Pulmonary/Chest: Effort normal. No respiratory distress.   Musculoskeletal:         General: No edema.      Cervical back: Normal range of motion.   Neurological: He is alert and oriented to person, place, and time.   Skin: No rash noted.   Psychiatric: He has a normal mood and affect.   Vitals reviewed.    Personal Diagnostic Review    No image results found.      Sleep study in media      Assessment/Plan:       Sleep maintenance insomnia   Denies falling asleep at work, would just like to stay asleep without being too groggy the next " morning  Low risk for PEDRITO, do not recommend in-lab sleep psg at this time  Due to history of anxiety and taking lexapro, recommend establishing with psychiatry for medication management for insomnia and anxiety  Will refer to cardiology as well for possible dysthymia on sleep study  Follow up as needed in pulmonary department    Problem List Items Addressed This Visit          Other    Primary insomnia - Primary    Relevant Orders    Ambulatory referral/consult to Psychiatry     Other Visit Diagnoses       Snoring        Cardiac arrhythmia, unspecified cardiac arrhythmia type        Relevant Orders    Ambulatory referral/consult to Cardiology            Follow up if symptoms worsen or fail to improve.    Discussed diagnosis, its evaluation, treatment and usual course. All questions answered.    Patient verbalized understanding of plan and left in no acute distress    Thank you for the courtesy of participating in the care of this patient    Elizabeth G Blough, PA-C [facility]

## 2025-01-15 ENCOUNTER — PATIENT MESSAGE (OUTPATIENT)
Dept: PSYCHIATRY | Facility: CLINIC | Age: 45
End: 2025-01-15
Payer: COMMERCIAL

## 2025-01-16 ENCOUNTER — TELEPHONE (OUTPATIENT)
Dept: CARDIOLOGY | Facility: CLINIC | Age: 45
End: 2025-01-16
Payer: COMMERCIAL

## 2025-01-31 ENCOUNTER — PATIENT MESSAGE (OUTPATIENT)
Dept: PULMONOLOGY | Facility: CLINIC | Age: 45
End: 2025-01-31
Payer: COMMERCIAL

## 2025-02-28 ENCOUNTER — OFFICE VISIT (OUTPATIENT)
Dept: PSYCHIATRY | Facility: CLINIC | Age: 45
End: 2025-02-28
Payer: COMMERCIAL

## 2025-02-28 VITALS — SYSTOLIC BLOOD PRESSURE: 126 MMHG | DIASTOLIC BLOOD PRESSURE: 85 MMHG | HEART RATE: 69 BPM

## 2025-02-28 DIAGNOSIS — F51.01 PRIMARY INSOMNIA: Primary | ICD-10-CM

## 2025-02-28 DIAGNOSIS — F41.9 ANXIETY: ICD-10-CM

## 2025-02-28 DIAGNOSIS — F41.1 GENERALIZED ANXIETY DISORDER: ICD-10-CM

## 2025-02-28 PROCEDURE — 99205 OFFICE O/P NEW HI 60 MIN: CPT | Mod: S$GLB,,, | Performed by: PSYCHOLOGIST

## 2025-02-28 PROCEDURE — 1159F MED LIST DOCD IN RCRD: CPT | Mod: CPTII,S$GLB,, | Performed by: PSYCHOLOGIST

## 2025-02-28 PROCEDURE — 3079F DIAST BP 80-89 MM HG: CPT | Mod: CPTII,S$GLB,, | Performed by: PSYCHOLOGIST

## 2025-02-28 PROCEDURE — 3074F SYST BP LT 130 MM HG: CPT | Mod: CPTII,S$GLB,, | Performed by: PSYCHOLOGIST

## 2025-02-28 PROCEDURE — 99999 PR PBB SHADOW E&M-EST. PATIENT-LVL III: CPT | Mod: PBBFAC,,, | Performed by: PSYCHOLOGIST

## 2025-02-28 RX ORDER — ZALEPLON 5 MG/1
5 CAPSULE ORAL NIGHTLY
Qty: 30 CAPSULE | Refills: 2 | Status: SHIPPED | OUTPATIENT
Start: 2025-02-28 | End: 2025-05-29

## 2025-02-28 NOTE — PROGRESS NOTES
"PSYCHIATRIC EVALUATION     Disclaimer: Evaluation and treatment is based on information presented to date. Any new information may affect assessment and findings.     Name: Ez Muniz Sr.  Age: 44 y.o.  : 1980    Preferred Name: Ez    Referring provider: Melia Maurer PA-C (PCP Dr. Lin Villagomez)    Chief Complaint:  Anxiety and insomnia    HPI:   Pt is referred to psychiatry by Melia Maurer PA-C in pulmonology (PCP Dr. Lin Villagomez) for management of anxiety and insomnia. This is his first time in psychiatry.     Current medications: Lexapro 10 mg qd for past 4 years (medication list says 20 mg but he's taking 10 mg for past year), Hydroxyzine 25 mg tid prn for episodic anxiety (since 2024).     Past medications: Zoloft, Buspar, Trazodone, Ambien (not effective for middle insomnia).  Saw therapist for short time --for anxiety    MENTAL HEALTH HISTORY:  Pt reports being anxious since adolescence, attributes to typical peer pressure at that age, trying to fit in. He reports current frequent anxiety/nervousness but not daily, frequent worry about finances, parenting, middle insomnia, and fatigue. He does not report physiological hyperarousal, avoidance, nor somatic symptoms such as GI upset ("appetite is good") typically associated with anxiety. He does not report having panic attacks. He denies current trauma-related symptoms (e.g., flashbacks, hypervigilance, nightmares). He reports that Lexapro has helped decrease his anxiety symptoms.     Pt reports having trouble sleeping for the past 10 years with no identifiable precipitant. He's able to fall asleep initially but wakes up 1-2 hours later and is sometimes able to fall back to sleep after 30-40 minutes "I try just lying there;" this cycle repeats throughout the night. He never feels rested. He reports no history of parasomnias--no sleepwalking, night terrors, or REM sleep behaviors/acting out dreams. He does not " "experience restless legs symptoms.    He does not report past or present symptoms consistent with a depressive episode. He does not report past or present symptoms consistent with a manic/hypomanic episode. He has no past suicide attempts and no past psychiatric hospitalizations. He's never experienced psychotic symptoms.    Pt completed home sleep study with no identified "clinically significant sleep disordered breathing."   Report per [facility] chart review:    Home Sleep Study  Order: [id number]   Status: Final result       Dx: Primary insomnia; Snoring; BMI 29.0-2     Procedure Note    PHYSICIAN INTERPRETATION AND COMMENTS: Clinically significant sleep disordered breathing is not identified.  Indications of cardiac dysrhythmia are recorded. Please refer to sleep disorders clinic    CLINICAL HISTORY: 44 year old male. BMI: 29.58 kg/mB2. Snoring, Insomnia. on Trazodone.    SLEEP STUDY FINDINGS: Patient underwent a 1 night Home Sleep Test and by behavioral criteria, slept for approximately 6.89 hours, with a sleep latency of 6 minutes and a sleep efficiency of 99%. The patient slept supine 67.8% of the night based on valid recording time of 6.9 hours. Snoring occurs for 10.1% (30 dB) of the study, 1.7% is very loud. The mean pulse rate is 63.8 BPM, with frequent pulse rate variability (51 events with >= 6 BPM increase/decrease per hour).    TREATMENT CONSIDERATIONS: Primary snoring; Consider InLAB polysomnography    DISEASE MANAGEMENT CONSIDERATIONS: None.      Orlando Hebert MD        PSYCHOSOCIAL:  Pt is a full time  at [employer]; he's been there for the past 13 years and likes for the most part. She's been  for the past 11 years. He has 2 sons (ages 15 and 10). He grew up in [place] with his biological parents and half siblings. Both parens are living; he is close with them. He graduated from high school in 2000 and attended [employer], graduated in 2005. Self-care: he " "exercises regularly--travel, sports, movies     ADHD: denied   Depressive Disorder: sleep change, tired/fatigued   Anxiety Disorder: anxiety/nervousness, fatigue, sleep problems, excessive worry, some irritability   Panic Disorder: denied    Manic Disorder: denied   Psychotic Disorder: denied   Substance Use:  Alcohol: socially 1 time per week     Review of Systems   Constitutional:  Positive for fatigue. Negative for activity change, appetite change and unexpected weight change.   Respiratory:  Negative for shortness of breath.    Psychiatric/Behavioral:  Positive for sleep disturbance. Negative for agitation, behavioral problems, confusion, decreased concentration, dysphoric mood, hallucinations, self-injury and suicidal ideas. The patient is nervous/anxious. The patient is not hyperactive.       Nutritional Screening: Considering the patient's height and weight, medications, medical history and preferences, should a referral be made to the dietitian? no    Constitutional:  Vitals:  Most recent vital signs were reviewed.   Last 3 sets of Vitals        12/13/2024    11:48 AM 1/10/2025     9:19 AM 2/28/2025     8:58 AM   Vitals - 1 value per visit   SYSTOLIC 122  126   DIASTOLIC 76  85   Pulse 70  69   Temp 96.6 °F (35.9 °C)     Resp 20     SPO2 99 %     Weight (lb) 204.37 204.37    Weight (kg) 92.7 92.7    Height  5' 9" (1.753 m)    BMI (Calculated)  30.2    Pain Score Zero            Psychiatric:  Oriented: x 3 / including: Date: 2/28/25; and aware meeting with [facility] [place] [place]   Attitude: cooperative, very pleasant   Eye Contact: good   Behavior: wnl   Mood: "good, tired"  Affect: appropriate range   Attention: spelled "WORLD" forward and backward and completed serial 7s 100-7=[id number]  Concentration: grossly intact   Thought Process: goal directed   Speech: intelligible  Volume: WNL   Quantity: limited detail provided in response to questions   Rhythm: WNL  Insight: fair to good   Threats: no SI / " "HI   Memory: Grossly intact  Psychosis: denies all   Estimate of Intellectual Function: average   Judgment (to simple situation): envelope="put in mailbox"   Relevant Elements of Neurological Exam: normal gait     Medical history:   Past Medical History:   Diagnosis Date    Anxiety     Hypertension     Insomnia       Family History:  Family History   Problem Relation Name Age of Onset    Hypertension Mother Celina Vaughn     Hypertension Father Francisco Muniz     Cancer Maternal Grandfather Ez Vaughn     Cancer Paternal Grandfather Luigi Muniz       Family history of psychiatric illness: Maternal uncle: Schizophrenia    PSYCHO-SOCIAL DEVELOPMENT HISTORY:   Social History[1]     Allergy Review:   Review of patient's allergies indicates:  No Known Allergies     Medical Problem List:   Problem List[2]     Encounter Diagnoses   Name Primary?    Primary insomnia Yes    Generalized anxiety disorder     Anxiety       IMPRESSIONS/PLAN:  Pt meets diagnostic criteria for Generalized Anxiety Disorder and Primary insomnia.    Medication Management: Continue current medications. Discussed risks, benefits, and alternatives to treatment plan documented above with patient. I answered all patient questions related to this plan, and patient expressed understanding and agreement.    Discussed Sonata 5 mg qhs; reviewed associated benefits and risks.  Reviewed CBT evidence-based sleep hygiene skills:   Sleep Hygiene: 10 Tips for Better Sleep (POLLY Waldron, 2020)  Sleep Hygiene Tips handout ([email or web address])  Follow up in about 4 weeks (around 3/28/2025) for Medication follow up.     Medication List with Changes/Refills   New Medications    ZALEPLON (SONATA) 5 MG CAP    Take 1 capsule (5 mg total) by mouth every evening.   Current Medications    ESCITALOPRAM OXALATE (LEXAPRO) 20 MG TABLET    Take 1 tablet (20 mg total) by mouth once daily.    HYDROXYZINE HCL (ATARAX) 25 MG TABLET    Take 1 tablet (25 mg total) by mouth 3 (three) " times daily as needed for Anxiety.   Discontinued Medications    TRAZODONE (DESYREL) 50 MG TABLET    Take 1 tablet (50 mg total) by mouth nightly as needed for Insomnia.      Time spent with pt including note preparation: 70 minutes     Minoo Jackson, PhD, MP  Medical Psychologist         [1]   Social History  Socioeconomic History    Marital status:    Tobacco Use    Smoking status: Never    Smokeless tobacco: Never   Substance and Sexual Activity    Alcohol use: Yes     Alcohol/week: 4.0 standard drinks of alcohol     Types: 2 Cans of beer, 2 Shots of liquor per week    Drug use: No    Sexual activity: Yes     Partners: Female     Birth control/protection: None     Social Drivers of Health     Financial Resource Strain: Low Risk  (9/11/2024)    Overall Financial Resource Strain (CARDIA)     Difficulty of Paying Living Expenses: Not hard at all   Food Insecurity: No Food Insecurity (9/11/2024)    Hunger Vital Sign     Worried About Running Out of Food in the Last Year: Never true     Ran Out of Food in the Last Year: Never true   Physical Activity: Insufficiently Active (9/11/2024)    Exercise Vital Sign     Days of Exercise per Week: 3 days     Minutes of Exercise per Session: 40 min   Stress: Stress Concern Present (9/11/2024)    [employer] - Occupational Stress Questionnaire     Feeling of Stress : Very much   Housing Stability: Unknown (9/11/2024)    Housing Stability Vital Sign     Unable to Pay for Housing in the Last Year: No   [2]   Patient Active Problem List  Diagnosis    Generalized anxiety disorder    Obesity (BMI 30.0-34.9)    Abnormal EKG    Chronic fatigue    Prediabetes    Primary insomnia

## 2025-04-09 ENCOUNTER — OFFICE VISIT (OUTPATIENT)
Dept: PSYCHIATRY | Facility: CLINIC | Age: 45
End: 2025-04-09
Payer: COMMERCIAL

## 2025-04-09 VITALS — HEART RATE: 79 BPM | DIASTOLIC BLOOD PRESSURE: 83 MMHG | SYSTOLIC BLOOD PRESSURE: 118 MMHG

## 2025-04-09 DIAGNOSIS — F51.01 PRIMARY INSOMNIA: Primary | ICD-10-CM

## 2025-04-09 DIAGNOSIS — F41.1 GENERALIZED ANXIETY DISORDER: ICD-10-CM

## 2025-04-09 PROCEDURE — 3074F SYST BP LT 130 MM HG: CPT | Mod: CPTII,S$GLB,, | Performed by: PSYCHOLOGIST

## 2025-04-09 PROCEDURE — 3079F DIAST BP 80-89 MM HG: CPT | Mod: CPTII,S$GLB,, | Performed by: PSYCHOLOGIST

## 2025-04-09 PROCEDURE — 99999 PR PBB SHADOW E&M-EST. PATIENT-LVL II: CPT | Mod: PBBFAC,,, | Performed by: PSYCHOLOGIST

## 2025-04-09 PROCEDURE — 99214 OFFICE O/P EST MOD 30 MIN: CPT | Mod: S$GLB,,, | Performed by: PSYCHOLOGIST

## 2025-04-09 PROCEDURE — 1159F MED LIST DOCD IN RCRD: CPT | Mod: CPTII,S$GLB,, | Performed by: PSYCHOLOGIST

## 2025-04-09 RX ORDER — ZALEPLON 10 MG/1
10 CAPSULE ORAL NIGHTLY
Qty: 30 CAPSULE | Refills: 2 | Status: SHIPPED | OUTPATIENT
Start: 2025-04-09 | End: 2025-07-08

## 2025-04-24 ENCOUNTER — PATIENT MESSAGE (OUTPATIENT)
Dept: DERMATOLOGY | Facility: CLINIC | Age: 45
End: 2025-04-24
Payer: COMMERCIAL

## 2025-04-24 ENCOUNTER — PATIENT MESSAGE (OUTPATIENT)
Dept: PSYCHIATRY | Facility: CLINIC | Age: 45
End: 2025-04-24
Payer: COMMERCIAL

## 2025-04-29 DIAGNOSIS — F51.01 PRIMARY INSOMNIA: ICD-10-CM

## 2025-04-29 DIAGNOSIS — F41.1 GENERALIZED ANXIETY DISORDER: Primary | ICD-10-CM

## 2025-04-29 RX ORDER — QUETIAPINE FUMARATE 50 MG/1
25-50 TABLET, FILM COATED ORAL NIGHTLY
Qty: 30 TABLET | Refills: 2 | Status: SHIPPED | OUTPATIENT
Start: 2025-04-29

## 2025-05-15 ENCOUNTER — PATIENT MESSAGE (OUTPATIENT)
Dept: PSYCHIATRY | Facility: CLINIC | Age: 45
End: 2025-05-15
Payer: COMMERCIAL

## 2025-05-15 DIAGNOSIS — F41.9 ANXIETY: ICD-10-CM

## 2025-05-15 RX ORDER — ESCITALOPRAM OXALATE 20 MG/1
20 TABLET ORAL DAILY
Qty: 90 TABLET | Refills: 0 | Status: SHIPPED | OUTPATIENT
Start: 2025-05-15

## 2025-08-19 DIAGNOSIS — F41.9 ANXIETY: ICD-10-CM

## 2025-08-19 RX ORDER — ESCITALOPRAM OXALATE 20 MG/1
20 TABLET ORAL DAILY
Qty: 90 TABLET | Refills: 0 | Status: SHIPPED | OUTPATIENT
Start: 2025-08-19